# Patient Record
Sex: FEMALE | Race: BLACK OR AFRICAN AMERICAN | Employment: OTHER
[De-identification: names, ages, dates, MRNs, and addresses within clinical notes are randomized per-mention and may not be internally consistent; named-entity substitution may affect disease eponyms.]

---

## 2017-01-09 ENCOUNTER — TELEPHONE (OUTPATIENT)
Dept: OBGYN | Facility: CLINIC | Age: 72
End: 2017-01-09

## 2017-01-10 ENCOUNTER — OFFICE VISIT (OUTPATIENT)
Dept: FAMILY MEDICINE CLINIC | Facility: CLINIC | Age: 72
End: 2017-01-10

## 2017-01-10 VITALS
HEART RATE: 58 BPM | OXYGEN SATURATION: 98 % | SYSTOLIC BLOOD PRESSURE: 140 MMHG | DIASTOLIC BLOOD PRESSURE: 80 MMHG | BODY MASS INDEX: 33.06 KG/M2 | WEIGHT: 210.6 LBS | TEMPERATURE: 98.3 F | HEIGHT: 67 IN

## 2017-01-10 DIAGNOSIS — K59.01 SLOW TRANSIT CONSTIPATION: ICD-10-CM

## 2017-01-10 DIAGNOSIS — K64.8 INTERNAL AND EXTERNAL BLEEDING HEMORRHOIDS: ICD-10-CM

## 2017-01-10 DIAGNOSIS — K64.4 INTERNAL AND EXTERNAL BLEEDING HEMORRHOIDS: ICD-10-CM

## 2017-01-10 DIAGNOSIS — K62.89 RECTAL PAIN: Primary | ICD-10-CM

## 2017-01-10 PROCEDURE — 99213 OFFICE O/P EST LOW 20 MIN: CPT | Performed by: NURSE PRACTITIONER

## 2017-01-10 RX ORDER — AMOXICILLIN 250 MG
2 CAPSULE ORAL DAILY
Qty: 60 TABLET | Refills: 0 | Status: SHIPPED | OUTPATIENT
Start: 2017-01-10 | End: 2017-04-19 | Stop reason: ALTCHOICE

## 2017-01-10 ASSESSMENT — ENCOUNTER SYMPTOMS
NAUSEA: 0
ABDOMINAL PAIN: 0
ANAL BLEEDING: 1
COUGH: 0
CONSTIPATION: 1
DIARRHEA: 0
SHORTNESS OF BREATH: 0
RECTAL PAIN: 1
ABDOMINAL DISTENTION: 0
BLOOD IN STOOL: 0
VOMITING: 0

## 2017-01-12 ENCOUNTER — TELEPHONE (OUTPATIENT)
Dept: FAMILY MEDICINE CLINIC | Facility: CLINIC | Age: 72
End: 2017-01-12

## 2017-01-24 RX ORDER — POLYETHYLENE GLYCOL 3350 17 G/17G
17 POWDER, FOR SOLUTION ORAL DAILY
Qty: 527 G | Refills: 3 | Status: SHIPPED | OUTPATIENT
Start: 2017-01-24 | End: 2017-01-31 | Stop reason: SDUPTHER

## 2017-01-31 RX ORDER — POLYETHYLENE GLYCOL 3350 17 G/17G
17 POWDER, FOR SOLUTION ORAL DAILY
Qty: 527 G | Refills: 3 | Status: SHIPPED | OUTPATIENT
Start: 2017-01-31 | End: 2017-03-06 | Stop reason: SDUPTHER

## 2017-03-01 RX ORDER — POLYETHYLENE GLYCOL 3350 17 G/17G
17 POWDER, FOR SOLUTION ORAL DAILY
Qty: 527 G | Refills: 3 | OUTPATIENT
Start: 2017-03-01 | End: 2017-03-31

## 2017-03-06 RX ORDER — POLYETHYLENE GLYCOL 3350 17 G/17G
17 POWDER, FOR SOLUTION ORAL DAILY
Qty: 527 G | Refills: 3 | Status: SHIPPED | OUTPATIENT
Start: 2017-03-06 | End: 2017-04-05

## 2017-03-10 ENCOUNTER — CARE COORDINATION (OUTPATIENT)
Dept: CARE COORDINATION | Facility: CLINIC | Age: 72
End: 2017-03-10

## 2017-04-19 ENCOUNTER — OFFICE VISIT (OUTPATIENT)
Dept: FAMILY MEDICINE CLINIC | Age: 72
End: 2017-04-19
Payer: MEDICARE

## 2017-04-19 VITALS
DIASTOLIC BLOOD PRESSURE: 71 MMHG | TEMPERATURE: 99 F | RESPIRATION RATE: 18 BRPM | HEIGHT: 67 IN | HEART RATE: 64 BPM | SYSTOLIC BLOOD PRESSURE: 104 MMHG | BODY MASS INDEX: 32.18 KG/M2 | WEIGHT: 205 LBS

## 2017-04-19 DIAGNOSIS — R05.9 COUGH: Primary | ICD-10-CM

## 2017-04-19 DIAGNOSIS — B34.9 VIRAL SYNDROME: ICD-10-CM

## 2017-04-19 PROCEDURE — 99213 OFFICE O/P EST LOW 20 MIN: CPT | Performed by: NURSE PRACTITIONER

## 2017-04-19 RX ORDER — BENZONATATE 100 MG/1
100 CAPSULE ORAL 3 TIMES DAILY PRN
Qty: 60 CAPSULE | Refills: 0 | Status: SHIPPED | OUTPATIENT
Start: 2017-04-19 | End: 2017-04-26

## 2017-04-19 ASSESSMENT — ENCOUNTER SYMPTOMS
RHINORRHEA: 1
ABDOMINAL PAIN: 0
SWOLLEN GLANDS: 0
WHEEZING: 0
COUGH: 1
SHORTNESS OF BREATH: 0
TROUBLE SWALLOWING: 0
SORE THROAT: 0
SINUS PRESSURE: 1
CHEST TIGHTNESS: 1

## 2017-05-04 ENCOUNTER — TELEPHONE (OUTPATIENT)
Dept: FAMILY MEDICINE CLINIC | Age: 72
End: 2017-05-04

## 2017-05-04 DIAGNOSIS — Z79.899 CURRENT USE OF PROTON PUMP INHIBITOR: Primary | ICD-10-CM

## 2017-05-04 DIAGNOSIS — R73.01 IFG (IMPAIRED FASTING GLUCOSE): ICD-10-CM

## 2017-05-10 DIAGNOSIS — E78.5 HYPERLIPIDEMIA, UNSPECIFIED HYPERLIPIDEMIA TYPE: Primary | ICD-10-CM

## 2017-05-10 DIAGNOSIS — I10 ESSENTIAL HYPERTENSION: Chronic | ICD-10-CM

## 2017-05-11 ENCOUNTER — HOSPITAL ENCOUNTER (OUTPATIENT)
Age: 72
Setting detail: SPECIMEN
Discharge: HOME | End: 2017-05-11
Payer: MEDICARE

## 2017-05-11 DIAGNOSIS — Z79.899 CURRENT USE OF PROTON PUMP INHIBITOR: ICD-10-CM

## 2017-05-11 DIAGNOSIS — R73.01 IFG (IMPAIRED FASTING GLUCOSE): ICD-10-CM

## 2017-05-11 LAB
CHOLESTEROL/HDL RATIO: 3.6
CHOLESTEROL: 221 MG/DL
ESTIMATED AVERAGE GLUCOSE: 134 MG/DL
HBA1C MFR BLD: 6.3 % (ref 4–6)
HDLC SERPL-MCNC: 61 MG/DL
LDL CHOLESTEROL: 139 MG/DL (ref 0–130)
MAGNESIUM: 2 MG/DL (ref 1.6–2.6)
TRIGL SERPL-MCNC: 105 MG/DL
VLDLC SERPL CALC-MCNC: ABNORMAL MG/DL (ref 1–30)

## 2017-05-16 ENCOUNTER — OFFICE VISIT (OUTPATIENT)
Dept: FAMILY MEDICINE CLINIC | Age: 72
End: 2017-05-16
Payer: MEDICARE

## 2017-05-16 VITALS
WEIGHT: 203 LBS | DIASTOLIC BLOOD PRESSURE: 70 MMHG | TEMPERATURE: 96.8 F | HEIGHT: 67 IN | BODY MASS INDEX: 31.86 KG/M2 | OXYGEN SATURATION: 98 % | HEART RATE: 51 BPM | SYSTOLIC BLOOD PRESSURE: 115 MMHG

## 2017-05-16 DIAGNOSIS — K62.89 RECTAL PAIN: ICD-10-CM

## 2017-05-16 DIAGNOSIS — K59.01 SLOW TRANSIT CONSTIPATION: Primary | ICD-10-CM

## 2017-05-16 PROCEDURE — 3017F COLORECTAL CA SCREEN DOC REV: CPT | Performed by: NURSE PRACTITIONER

## 2017-05-16 PROCEDURE — G8427 DOCREV CUR MEDS BY ELIG CLIN: HCPCS | Performed by: NURSE PRACTITIONER

## 2017-05-16 PROCEDURE — G8399 PT W/DXA RESULTS DOCUMENT: HCPCS | Performed by: NURSE PRACTITIONER

## 2017-05-16 PROCEDURE — 1090F PRES/ABSN URINE INCON ASSESS: CPT | Performed by: NURSE PRACTITIONER

## 2017-05-16 PROCEDURE — 3014F SCREEN MAMMO DOC REV: CPT | Performed by: NURSE PRACTITIONER

## 2017-05-16 PROCEDURE — 1036F TOBACCO NON-USER: CPT | Performed by: NURSE PRACTITIONER

## 2017-05-16 PROCEDURE — 4040F PNEUMOC VAC/ADMIN/RCVD: CPT | Performed by: NURSE PRACTITIONER

## 2017-05-16 PROCEDURE — 1123F ACP DISCUSS/DSCN MKR DOCD: CPT | Performed by: NURSE PRACTITIONER

## 2017-05-16 PROCEDURE — 99213 OFFICE O/P EST LOW 20 MIN: CPT | Performed by: NURSE PRACTITIONER

## 2017-05-16 PROCEDURE — G8419 CALC BMI OUT NRM PARAM NOF/U: HCPCS | Performed by: NURSE PRACTITIONER

## 2017-05-16 RX ORDER — POLYETHYLENE GLYCOL 3350 17 G/17G
POWDER, FOR SOLUTION ORAL
COMMUNITY
Start: 2017-04-26 | End: 2019-02-12 | Stop reason: SDUPTHER

## 2017-05-16 RX ORDER — GABAPENTIN 100 MG/1
100 CAPSULE ORAL DAILY
Qty: 90 CAPSULE | Refills: 0 | Status: SHIPPED | OUTPATIENT
Start: 2017-05-16 | End: 2017-08-22 | Stop reason: SINTOL

## 2017-05-16 RX ORDER — OMEPRAZOLE 20 MG/1
CAPSULE, DELAYED RELEASE ORAL
Qty: 90 CAPSULE | Refills: 1 | Status: SHIPPED | OUTPATIENT
Start: 2017-05-16 | End: 2018-01-11 | Stop reason: SDUPTHER

## 2017-05-16 ASSESSMENT — ENCOUNTER SYMPTOMS
RECTAL PAIN: 1
COUGH: 0
CHEST TIGHTNESS: 0
NAUSEA: 0
CONSTIPATION: 0
VOMITING: 0
ANAL BLEEDING: 0
DIARRHEA: 0
BLOOD IN STOOL: 0
ABDOMINAL PAIN: 0
SHORTNESS OF BREATH: 0
ABDOMINAL DISTENTION: 0

## 2017-05-19 ENCOUNTER — TELEPHONE (OUTPATIENT)
Dept: FAMILY MEDICINE CLINIC | Age: 72
End: 2017-05-19

## 2017-05-25 ENCOUNTER — TELEPHONE (OUTPATIENT)
Dept: FAMILY MEDICINE CLINIC | Age: 72
End: 2017-05-25

## 2017-05-25 DIAGNOSIS — K62.89 RECTAL PAIN: Primary | ICD-10-CM

## 2017-05-25 RX ORDER — HYDROCORTISONE ACETATE 25 MG/1
25 SUPPOSITORY RECTAL EVERY 12 HOURS
Qty: 28 SUPPOSITORY | Refills: 0 | Status: SHIPPED | OUTPATIENT
Start: 2017-05-25 | End: 2017-06-08

## 2017-06-26 RX ORDER — POLYETHYLENE GLYCOL 3350 17 G/17G
POWDER, FOR SOLUTION ORAL
Qty: 100 EACH | Refills: 2 | Status: SHIPPED | OUTPATIENT
Start: 2017-06-26 | End: 2020-03-16

## 2017-08-09 ENCOUNTER — TELEPHONE (OUTPATIENT)
Dept: FAMILY MEDICINE CLINIC | Age: 72
End: 2017-08-09

## 2017-08-09 DIAGNOSIS — Z12.31 ENCOUNTER FOR SCREENING MAMMOGRAM FOR MALIGNANT NEOPLASM OF BREAST: Primary | ICD-10-CM

## 2017-08-22 ENCOUNTER — OFFICE VISIT (OUTPATIENT)
Dept: FAMILY MEDICINE CLINIC | Age: 72
End: 2017-08-22
Payer: MEDICARE

## 2017-08-22 VITALS
TEMPERATURE: 96.2 F | OXYGEN SATURATION: 97 % | BODY MASS INDEX: 32.24 KG/M2 | SYSTOLIC BLOOD PRESSURE: 134 MMHG | HEIGHT: 67 IN | HEART RATE: 53 BPM | WEIGHT: 205.4 LBS | DIASTOLIC BLOOD PRESSURE: 74 MMHG

## 2017-08-22 DIAGNOSIS — K64.9 HEMORRHOIDS, UNSPECIFIED HEMORRHOID TYPE: Primary | ICD-10-CM

## 2017-08-22 DIAGNOSIS — K62.89 RECTAL PAIN: ICD-10-CM

## 2017-08-22 DIAGNOSIS — K59.01 SLOW TRANSIT CONSTIPATION: ICD-10-CM

## 2017-08-22 PROCEDURE — 4040F PNEUMOC VAC/ADMIN/RCVD: CPT | Performed by: NURSE PRACTITIONER

## 2017-08-22 PROCEDURE — 1123F ACP DISCUSS/DSCN MKR DOCD: CPT | Performed by: NURSE PRACTITIONER

## 2017-08-22 PROCEDURE — G8419 CALC BMI OUT NRM PARAM NOF/U: HCPCS | Performed by: NURSE PRACTITIONER

## 2017-08-22 PROCEDURE — G8427 DOCREV CUR MEDS BY ELIG CLIN: HCPCS | Performed by: NURSE PRACTITIONER

## 2017-08-22 PROCEDURE — 1090F PRES/ABSN URINE INCON ASSESS: CPT | Performed by: NURSE PRACTITIONER

## 2017-08-22 PROCEDURE — 3017F COLORECTAL CA SCREEN DOC REV: CPT | Performed by: NURSE PRACTITIONER

## 2017-08-22 PROCEDURE — 1036F TOBACCO NON-USER: CPT | Performed by: NURSE PRACTITIONER

## 2017-08-22 PROCEDURE — 3014F SCREEN MAMMO DOC REV: CPT | Performed by: NURSE PRACTITIONER

## 2017-08-22 PROCEDURE — G8399 PT W/DXA RESULTS DOCUMENT: HCPCS | Performed by: NURSE PRACTITIONER

## 2017-08-22 PROCEDURE — 99213 OFFICE O/P EST LOW 20 MIN: CPT | Performed by: NURSE PRACTITIONER

## 2017-08-22 ASSESSMENT — ENCOUNTER SYMPTOMS
SHORTNESS OF BREATH: 0
ABDOMINAL PAIN: 0
COUGH: 0
NAUSEA: 0
RECTAL PAIN: 1
ANAL BLEEDING: 0
VOMITING: 0
DIARRHEA: 0
BACK PAIN: 0
CHEST TIGHTNESS: 0
CONSTIPATION: 0
BLOOD IN STOOL: 0
ABDOMINAL DISTENTION: 0

## 2017-10-05 DIAGNOSIS — R92.8 ABNORMAL MAMMOGRAM OF LEFT BREAST: Primary | ICD-10-CM

## 2017-10-10 DIAGNOSIS — R92.8 ABNORMAL MAMMOGRAM OF LEFT BREAST: ICD-10-CM

## 2017-10-10 DIAGNOSIS — Z12.31 ENCOUNTER FOR SCREENING MAMMOGRAM FOR MALIGNANT NEOPLASM OF BREAST: ICD-10-CM

## 2017-10-12 LAB
CREATININE: 0.8 MG/DL
CREATININE: 0.8 MG/DL
POTASSIUM (K+): 4.2

## 2018-01-09 ENCOUNTER — TELEPHONE (OUTPATIENT)
Dept: FAMILY MEDICINE CLINIC | Age: 73
End: 2018-01-09

## 2018-01-11 RX ORDER — OMEPRAZOLE 20 MG/1
CAPSULE, DELAYED RELEASE ORAL
Qty: 90 CAPSULE | Refills: 1 | Status: SHIPPED | OUTPATIENT
Start: 2018-01-11 | End: 2018-07-10 | Stop reason: SDUPTHER

## 2018-01-15 ENCOUNTER — OFFICE VISIT (OUTPATIENT)
Dept: FAMILY MEDICINE CLINIC | Age: 73
End: 2018-01-15
Payer: MEDICARE

## 2018-01-15 VITALS
SYSTOLIC BLOOD PRESSURE: 118 MMHG | WEIGHT: 212.2 LBS | HEIGHT: 67 IN | DIASTOLIC BLOOD PRESSURE: 74 MMHG | OXYGEN SATURATION: 98 % | HEART RATE: 116 BPM | BODY MASS INDEX: 33.3 KG/M2 | TEMPERATURE: 98.2 F

## 2018-01-15 DIAGNOSIS — M79.602 LEFT ARM PAIN: Primary | ICD-10-CM

## 2018-01-15 PROCEDURE — G8417 CALC BMI ABV UP PARAM F/U: HCPCS | Performed by: NURSE PRACTITIONER

## 2018-01-15 PROCEDURE — 3014F SCREEN MAMMO DOC REV: CPT | Performed by: NURSE PRACTITIONER

## 2018-01-15 PROCEDURE — 4040F PNEUMOC VAC/ADMIN/RCVD: CPT | Performed by: NURSE PRACTITIONER

## 2018-01-15 PROCEDURE — 3017F COLORECTAL CA SCREEN DOC REV: CPT | Performed by: NURSE PRACTITIONER

## 2018-01-15 PROCEDURE — 1036F TOBACCO NON-USER: CPT | Performed by: NURSE PRACTITIONER

## 2018-01-15 PROCEDURE — 1090F PRES/ABSN URINE INCON ASSESS: CPT | Performed by: NURSE PRACTITIONER

## 2018-01-15 PROCEDURE — 99213 OFFICE O/P EST LOW 20 MIN: CPT | Performed by: NURSE PRACTITIONER

## 2018-01-15 PROCEDURE — G8484 FLU IMMUNIZE NO ADMIN: HCPCS | Performed by: NURSE PRACTITIONER

## 2018-01-15 PROCEDURE — G8427 DOCREV CUR MEDS BY ELIG CLIN: HCPCS | Performed by: NURSE PRACTITIONER

## 2018-01-15 PROCEDURE — G8399 PT W/DXA RESULTS DOCUMENT: HCPCS | Performed by: NURSE PRACTITIONER

## 2018-01-15 PROCEDURE — 1123F ACP DISCUSS/DSCN MKR DOCD: CPT | Performed by: NURSE PRACTITIONER

## 2018-01-15 ASSESSMENT — ENCOUNTER SYMPTOMS
SHORTNESS OF BREATH: 0
COUGH: 0
VOMITING: 0
CHEST TIGHTNESS: 0
NAUSEA: 0
ABDOMINAL PAIN: 0

## 2018-01-15 NOTE — PROGRESS NOTES
[address]  [place], [address]  [phone]      Da Gaviria is a 67 y.o. female who presents today for her  medical conditions/complaints as noted below. Da Gaviria is c/o of Arm Pain (thought patch was causing problem with left arm aching,cardiology does not think so,feels pain everyday,was advised to take magnesium,used for 6 days but causes constipation. trying pure mineral skin spray that helps for about 20-30 mins, Bowels have been better)  . HPI:   Arm Pain    The incident occurred more than 1 week ago (last 3 months worsening to constant). There was no injury mechanism. The pain is present in the left shoulder, left hand and left elbow. The quality of the pain is described as aching. The pain radiates to the left arm and left hand. The pain is moderate. The pain has been constant since the incident. Pertinent negatives include no chest pain, muscle weakness, numbness or tingling. The symptoms are aggravated by palpation and movement. She has tried acetaminophen, elevation, heat, rest and NSAIDs (all natural mineral spray) for the symptoms. The treatment provided mild relief.        Past Medical History:   Diagnosis Date    Atrial flutter ([address]     with palpitations    Chronic constipation     whole life    Frequent sinus infections     GERD (gastroesophageal reflux disease)     resolved    Hemorrhoid 10/26/2016    internal and external    Hemorrhoids 8/22/2017    Hypertension     Pain     Palpitations     Tooth wear     FALSE TOOTH LOWER BOTTOM REMOVABLE PERMANENT DENTAL WORK    Wears glasses       Past Surgical History:   Procedure Laterality Date    ANUS SURGERY      fistulotomy    APPENDECTOMY  1960    BREAST BIOPSY Left 1988    COLONOSCOPY  10/26/2016    adenomatous polyps(tubular adenomas)    HYSTERECTOMY      FIBROIDS; one ovarian preservation    KNEE ARTHROSCOPY      LEFT    LUMBAR DISC SURGERY      LUMBAR FUSION

## 2018-03-28 ENCOUNTER — OFFICE VISIT (OUTPATIENT)
Dept: FAMILY MEDICINE CLINIC | Age: 73
End: 2018-03-28
Payer: MEDICARE

## 2018-03-28 VITALS
HEIGHT: 67 IN | DIASTOLIC BLOOD PRESSURE: 74 MMHG | WEIGHT: 213 LBS | TEMPERATURE: 98 F | BODY MASS INDEX: 33.43 KG/M2 | HEART RATE: 58 BPM | OXYGEN SATURATION: 98 % | SYSTOLIC BLOOD PRESSURE: 132 MMHG

## 2018-03-28 DIAGNOSIS — M15.9 OSTEOARTHRITIS OF MULTIPLE JOINTS, UNSPECIFIED OSTEOARTHRITIS TYPE: Primary | ICD-10-CM

## 2018-03-28 PROCEDURE — G8427 DOCREV CUR MEDS BY ELIG CLIN: HCPCS | Performed by: NURSE PRACTITIONER

## 2018-03-28 PROCEDURE — 1036F TOBACCO NON-USER: CPT | Performed by: NURSE PRACTITIONER

## 2018-03-28 PROCEDURE — 1123F ACP DISCUSS/DSCN MKR DOCD: CPT | Performed by: NURSE PRACTITIONER

## 2018-03-28 PROCEDURE — G8417 CALC BMI ABV UP PARAM F/U: HCPCS | Performed by: NURSE PRACTITIONER

## 2018-03-28 PROCEDURE — 3014F SCREEN MAMMO DOC REV: CPT | Performed by: NURSE PRACTITIONER

## 2018-03-28 PROCEDURE — 3017F COLORECTAL CA SCREEN DOC REV: CPT | Performed by: NURSE PRACTITIONER

## 2018-03-28 PROCEDURE — 4040F PNEUMOC VAC/ADMIN/RCVD: CPT | Performed by: NURSE PRACTITIONER

## 2018-03-28 PROCEDURE — 99213 OFFICE O/P EST LOW 20 MIN: CPT | Performed by: NURSE PRACTITIONER

## 2018-03-28 PROCEDURE — G8399 PT W/DXA RESULTS DOCUMENT: HCPCS | Performed by: NURSE PRACTITIONER

## 2018-03-28 PROCEDURE — 1090F PRES/ABSN URINE INCON ASSESS: CPT | Performed by: NURSE PRACTITIONER

## 2018-03-28 PROCEDURE — G8484 FLU IMMUNIZE NO ADMIN: HCPCS | Performed by: NURSE PRACTITIONER

## 2018-03-28 RX ORDER — MELOXICAM 7.5 MG/1
7.5 TABLET ORAL DAILY
Qty: 30 TABLET | Refills: 3 | Status: SHIPPED | OUTPATIENT
Start: 2018-03-28 | End: 2018-04-12 | Stop reason: ALTCHOICE

## 2018-03-28 ASSESSMENT — ENCOUNTER SYMPTOMS
COLOR CHANGE: 0
SHORTNESS OF BREATH: 0
BACK PAIN: 1

## 2018-03-28 NOTE — PROGRESS NOTES
Visit Information    Have you changed or started any medications since your last visit including any over-the-counter medicines, vitamins, or herbal medicines? no   Have you stopped taking any of your medications? Is so, why? -  no  Are you having any side effects from any of your medications? - no    Have you seen any other physician or provider since your last visit?  no   Have you had any other diagnostic tests since your last visit?  no   Have you been seen in the emergency room and/or had an admission in a hospital since we last saw you?  no   Have you had your routine dental cleaning in the past 6 months?  no     Do you have an active MyChart account? If no, what is the barrier?   No: na    Patient Care Team:  Anjelica Wallace CNP as PCP - General (Nurse Practitioner)    Medical History Review  Past Medical, Family, and Social History reviewed and  contribute to the patient presenting condition    Health Maintenance   Topic Date Due    DTaP/Tdap/Td vaccine (1 - Tdap) 03/27/1963    Shingles Vaccine (1 of 2 - 2 Dose Series) 03/27/1994    Pneumococcal low/med risk (1 of 2 - PCV13) 03/27/2009    A1C test (Diabetic or Prediabetic)  05/11/2018    Potassium monitoring  10/12/2018    Creatinine monitoring  10/12/2018    Breast cancer screen  10/10/2019    Lipid screen  05/11/2022    Colon cancer screen colonoscopy  10/26/2026    DEXA (modify frequency per FRAX score)  Addressed

## 2018-04-05 ENCOUNTER — TELEPHONE (OUTPATIENT)
Dept: FAMILY MEDICINE CLINIC | Age: 73
End: 2018-04-05

## 2018-04-12 ENCOUNTER — TELEPHONE (OUTPATIENT)
Dept: FAMILY MEDICINE CLINIC | Age: 73
End: 2018-04-12

## 2018-04-12 RX ORDER — IBUPROFEN 800 MG/1
800 TABLET ORAL 2 TIMES DAILY PRN
Qty: 60 TABLET | Refills: 1 | Status: SHIPPED | OUTPATIENT
Start: 2018-04-12 | End: 2018-11-08 | Stop reason: SDUPTHER

## 2018-05-07 ENCOUNTER — TELEPHONE (OUTPATIENT)
Dept: FAMILY MEDICINE CLINIC | Age: 73
End: 2018-05-07

## 2018-05-30 RX ORDER — NEBIVOLOL 10 MG/1
TABLET ORAL
Qty: 270 TABLET | Refills: 1 | Status: SHIPPED | OUTPATIENT
Start: 2018-05-30 | End: 2018-11-08 | Stop reason: SDUPTHER

## 2018-07-10 RX ORDER — OMEPRAZOLE 20 MG/1
CAPSULE, DELAYED RELEASE ORAL
Qty: 90 CAPSULE | Refills: 1 | Status: SHIPPED | OUTPATIENT
Start: 2018-07-10 | End: 2019-01-06 | Stop reason: SDUPTHER

## 2018-07-25 ENCOUNTER — OFFICE VISIT (OUTPATIENT)
Dept: FAMILY MEDICINE CLINIC | Age: 73
End: 2018-07-25
Payer: MEDICARE

## 2018-07-25 VITALS
HEIGHT: 67 IN | BODY MASS INDEX: 32.8 KG/M2 | DIASTOLIC BLOOD PRESSURE: 78 MMHG | OXYGEN SATURATION: 98 % | WEIGHT: 209 LBS | TEMPERATURE: 96.4 F | HEART RATE: 50 BPM | SYSTOLIC BLOOD PRESSURE: 138 MMHG

## 2018-07-25 DIAGNOSIS — R73.03 PREDIABETES: ICD-10-CM

## 2018-07-25 DIAGNOSIS — B37.2 YEAST DERMATITIS: Primary | ICD-10-CM

## 2018-07-25 LAB — HBA1C MFR BLD: 6.2 %

## 2018-07-25 PROCEDURE — 3014F SCREEN MAMMO DOC REV: CPT | Performed by: NURSE PRACTITIONER

## 2018-07-25 PROCEDURE — 83036 HEMOGLOBIN GLYCOSYLATED A1C: CPT | Performed by: NURSE PRACTITIONER

## 2018-07-25 PROCEDURE — 4040F PNEUMOC VAC/ADMIN/RCVD: CPT | Performed by: NURSE PRACTITIONER

## 2018-07-25 PROCEDURE — 1123F ACP DISCUSS/DSCN MKR DOCD: CPT | Performed by: NURSE PRACTITIONER

## 2018-07-25 PROCEDURE — G8399 PT W/DXA RESULTS DOCUMENT: HCPCS | Performed by: NURSE PRACTITIONER

## 2018-07-25 PROCEDURE — 99213 OFFICE O/P EST LOW 20 MIN: CPT | Performed by: NURSE PRACTITIONER

## 2018-07-25 PROCEDURE — 1036F TOBACCO NON-USER: CPT | Performed by: NURSE PRACTITIONER

## 2018-07-25 PROCEDURE — G8427 DOCREV CUR MEDS BY ELIG CLIN: HCPCS | Performed by: NURSE PRACTITIONER

## 2018-07-25 PROCEDURE — 1090F PRES/ABSN URINE INCON ASSESS: CPT | Performed by: NURSE PRACTITIONER

## 2018-07-25 PROCEDURE — 3017F COLORECTAL CA SCREEN DOC REV: CPT | Performed by: NURSE PRACTITIONER

## 2018-07-25 PROCEDURE — 1101F PT FALLS ASSESS-DOCD LE1/YR: CPT | Performed by: NURSE PRACTITIONER

## 2018-07-25 PROCEDURE — G8417 CALC BMI ABV UP PARAM F/U: HCPCS | Performed by: NURSE PRACTITIONER

## 2018-07-25 ASSESSMENT — PATIENT HEALTH QUESTIONNAIRE - PHQ9
SUM OF ALL RESPONSES TO PHQ QUESTIONS 1-9: 0
SUM OF ALL RESPONSES TO PHQ9 QUESTIONS 1 & 2: 0
2. FEELING DOWN, DEPRESSED OR HOPELESS: 0
1. LITTLE INTEREST OR PLEASURE IN DOING THINGS: 0

## 2018-07-25 ASSESSMENT — ENCOUNTER SYMPTOMS: SHORTNESS OF BREATH: 0

## 2018-07-25 NOTE — PROGRESS NOTES
[address] [facility], [address]  [phone]      Paty Nesbitt is a 75 y.o. female who presents today for her  medical conditions/complaints as noted below. Paty Nesbitt is c/o of Rash (under breasts,itching)  . HPI:   Pt here for evaluation of new onset rash under bilateral breasts. This started a few days ago and has been itchy. She has tried a dry towel under her breasts with no relief. She has not changed any of her bras and or clothing.          Past Medical History:   Diagnosis Date    Atrial flutter (HCC)     with palpitations    Chronic constipation     whole life    Frequent sinus infections     GERD (gastroesophageal reflux disease)     resolved    Hemorrhoid 10/26/2016    internal and external    Hemorrhoids 8/22/2017    Hypertension     Pain     Palpitations     Tooth wear     FALSE TOOTH LOWER BOTTOM REMOVABLE PERMANENT DENTAL WORK    Wears glasses       Past Surgical History:   Procedure Laterality Date    ANUS SURGERY      fistulotomy    APPENDECTOMY  1960    BREAST BIOPSY Left 1988    COLONOSCOPY  10/26/2016    adenomatous polyps(tubular adenomas)    HYSTERECTOMY      FIBROIDS; one ovarian preservation    KNEE ARTHROSCOPY      LEFT    LUMBAR DISC SURGERY      LUMBAR FUSION  12/30/2013    Transforaminal Lumbar Interbody Fusion L4-5, S1    THYROIDECTOMY, PARTIAL      TONSILLECTOMY       Family History   Problem Relation Age of Onset    Cancer Sister         pancrease    Heart Disease Mother     Heart Disease Father     Diabetes Brother     No Known Problems Maternal Grandmother     No Known Problems Maternal Grandfather     No Known Problems Paternal Grandmother     No Known Problems Paternal Grandfather      Social History   Substance Use Topics    Smoking status: Former Smoker     Packs/day: 0.25     Years: 5.00     Types: Cigarettes     Quit date: 7/25/1977    Smokeless tobacco: Never Used      Comment: QUIT SMOKING YRS AGO 1970's    Alcohol use 0.6 oz/week     1 Glasses of wine per week      Comment: OCC WINE      Current Outpatient Prescriptions   Medication Sig Dispense Refill    nystatin-triamcinolone (MYCOLOG II) 260409-4.7 UNIT/GM-% cream Apply topically 2 times daily. 30 g 1    omeprazole (PRILOSEC) 20 MG delayed release capsule TAKE 1 CAPSULE DAILY 90 capsule 1    nebivolol (BYSTOLIC) 10 MG tablet TAKE 1 TABLET IN THE MORNING AND 2 TABLETS AT NIGHT 270 tablet 1    ibuprofen (ADVIL;MOTRIN) 800 MG tablet Take 1 tablet by mouth 2 times daily as needed for Pain 60 tablet 1    cloNIDine (CATAPRES) 0.2 MG/24HR APPLY 1 PATCH WEEKLY 12 patch 2    hydrocortisone-pramoxine (ANALPRAM-HC) 2.5-1 % rectal cream Place rectally 3 times daily 30 g 0    polyethylene glycol (GLYCOLAX) packet DISSOLVE ONE PACKET IN 8OZ LIQUID & DRINK BY MOUTH DAILY 100 each 2    losartan-hydrochlorothiazide (HYZAAR) 100-25 MG per tablet Take 1 tablet by mouth nightly       aspirin 81 MG tablet Take 1 tablet by mouth daily. 30 tablet 0    polyethylene glycol (GLYCOLAX) packet        No current facility-administered medications for this visit.       Allergies   Allergen Reactions    Codeine      Feels like will pass out    Other Other (See Comments)     Red dyes dizziness     Cortisone     Flu Virus Vaccine Nausea And Vomiting    Synthroid [Levothyroxine Sodium] Nausea And Vomiting       Health Maintenance   Topic Date Due    A1C test (Diabetic or Prediabetic)  05/11/2018    Pneumococcal low/med risk (1 of 2 - PCV13) 03/07/2019 (Originally 3/27/2009)    DTaP/Tdap/Td vaccine (1 - Tdap) 07/14/2020 (Originally 3/27/1963)    Shingles Vaccine (1 of 2 - 2 Dose Series) 07/06/2021 (Originally 3/27/1994)    Potassium monitoring  10/12/2018    Creatinine monitoring  10/12/2018    Breast cancer screen  10/10/2019    Lipid screen  05/11/2022    Colon cancer screen colonoscopy  10/26/2026    DEXA (modify frequency per FRAX score)

## 2018-09-18 ENCOUNTER — OFFICE VISIT (OUTPATIENT)
Dept: FAMILY MEDICINE CLINIC | Age: 73
End: 2018-09-18
Payer: MEDICARE

## 2018-09-18 VITALS
HEIGHT: 67 IN | OXYGEN SATURATION: 97 % | DIASTOLIC BLOOD PRESSURE: 76 MMHG | TEMPERATURE: 96.8 F | SYSTOLIC BLOOD PRESSURE: 130 MMHG | BODY MASS INDEX: 32.96 KG/M2 | HEART RATE: 56 BPM | WEIGHT: 210 LBS

## 2018-09-18 DIAGNOSIS — B37.2 YEAST DERMATITIS: Primary | ICD-10-CM

## 2018-09-18 PROCEDURE — 3017F COLORECTAL CA SCREEN DOC REV: CPT | Performed by: NURSE PRACTITIONER

## 2018-09-18 PROCEDURE — 3014F SCREEN MAMMO DOC REV: CPT | Performed by: NURSE PRACTITIONER

## 2018-09-18 PROCEDURE — 1036F TOBACCO NON-USER: CPT | Performed by: NURSE PRACTITIONER

## 2018-09-18 PROCEDURE — 1090F PRES/ABSN URINE INCON ASSESS: CPT | Performed by: NURSE PRACTITIONER

## 2018-09-18 PROCEDURE — 99213 OFFICE O/P EST LOW 20 MIN: CPT | Performed by: NURSE PRACTITIONER

## 2018-09-18 PROCEDURE — 4040F PNEUMOC VAC/ADMIN/RCVD: CPT | Performed by: NURSE PRACTITIONER

## 2018-09-18 PROCEDURE — G8399 PT W/DXA RESULTS DOCUMENT: HCPCS | Performed by: NURSE PRACTITIONER

## 2018-09-18 PROCEDURE — G8427 DOCREV CUR MEDS BY ELIG CLIN: HCPCS | Performed by: NURSE PRACTITIONER

## 2018-09-18 PROCEDURE — 1123F ACP DISCUSS/DSCN MKR DOCD: CPT | Performed by: NURSE PRACTITIONER

## 2018-09-18 PROCEDURE — G8417 CALC BMI ABV UP PARAM F/U: HCPCS | Performed by: NURSE PRACTITIONER

## 2018-09-18 PROCEDURE — 1101F PT FALLS ASSESS-DOCD LE1/YR: CPT | Performed by: NURSE PRACTITIONER

## 2018-09-18 RX ORDER — NYSTATIN 100000 [USP'U]/G
POWDER TOPICAL
Qty: 60 G | Refills: 0 | Status: SHIPPED | OUTPATIENT
Start: 2018-09-18 | End: 2018-09-24 | Stop reason: SINTOL

## 2018-09-18 ASSESSMENT — ENCOUNTER SYMPTOMS
SHORTNESS OF BREATH: 0
ABDOMINAL PAIN: 0
BACK PAIN: 0

## 2018-09-18 NOTE — PROGRESS NOTES
answered. Pt voiced understanding. Reviewed health maintenance. Instructed to continue current medications, diet and exercise.     Electronically signed by Elisa Mckinnon CNP on 9/18/2018 at 10:24 AM

## 2018-09-24 ENCOUNTER — TELEPHONE (OUTPATIENT)
Dept: FAMILY MEDICINE CLINIC | Age: 73
End: 2018-09-24

## 2018-09-24 RX ORDER — FLUCONAZOLE 100 MG/1
100 TABLET ORAL DAILY
Qty: 5 TABLET | Refills: 0 | Status: SHIPPED | OUTPATIENT
Start: 2018-09-24 | End: 2018-09-28 | Stop reason: SDUPTHER

## 2018-09-28 RX ORDER — FLUCONAZOLE 100 MG/1
100 TABLET ORAL DAILY
Qty: 2 TABLET | Refills: 0 | Status: SHIPPED | OUTPATIENT
Start: 2018-09-28 | End: 2018-09-30

## 2018-09-28 NOTE — TELEPHONE ENCOUNTER
Patient called stating she is on her last pill of the diflucan. She states it helped a lot with the itching but not all the way gone, she is wondering if she needs another prescription? Please advise.  Thank you

## 2018-10-01 ENCOUNTER — TELEPHONE (OUTPATIENT)
Dept: FAMILY MEDICINE CLINIC | Age: 73
End: 2018-10-01

## 2018-10-02 ENCOUNTER — NURSE ONLY (OUTPATIENT)
Dept: FAMILY MEDICINE CLINIC | Age: 73
End: 2018-10-02
Payer: MEDICARE

## 2018-10-02 ENCOUNTER — TELEPHONE (OUTPATIENT)
Dept: FAMILY MEDICINE CLINIC | Age: 73
End: 2018-10-02

## 2018-10-02 DIAGNOSIS — Z23 NEED FOR PNEUMOCOCCAL VACCINATION: Primary | ICD-10-CM

## 2018-10-02 DIAGNOSIS — Z12.39 SCREENING FOR BREAST CANCER: Primary | ICD-10-CM

## 2018-10-02 DIAGNOSIS — M25.569 KNEE PAIN, UNSPECIFIED CHRONICITY, UNSPECIFIED LATERALITY: ICD-10-CM

## 2018-10-02 DIAGNOSIS — M25.559 ARTHRALGIA OF HIP, UNSPECIFIED LATERALITY: ICD-10-CM

## 2018-10-02 PROCEDURE — 90670 PCV13 VACCINE IM: CPT | Performed by: NURSE PRACTITIONER

## 2018-10-02 PROCEDURE — G0009 ADMIN PNEUMOCOCCAL VACCINE: HCPCS | Performed by: NURSE PRACTITIONER

## 2018-10-05 ENCOUNTER — OFFICE VISIT (OUTPATIENT)
Dept: ORTHOPEDIC SURGERY | Age: 73
End: 2018-10-05
Payer: MEDICARE

## 2018-10-05 VITALS — WEIGHT: 206 LBS | BODY MASS INDEX: 32.33 KG/M2 | HEIGHT: 67 IN

## 2018-10-05 DIAGNOSIS — M17.12 ARTHRITIS OF LEFT KNEE: ICD-10-CM

## 2018-10-05 DIAGNOSIS — M17.11 ARTHRITIS OF RIGHT KNEE: Primary | ICD-10-CM

## 2018-10-05 DIAGNOSIS — M16.11 ARTHRITIS OF RIGHT HIP: ICD-10-CM

## 2018-10-05 DIAGNOSIS — Q79.9 BONY ABNORMALITY: ICD-10-CM

## 2018-10-05 PROCEDURE — 1090F PRES/ABSN URINE INCON ASSESS: CPT | Performed by: ORTHOPAEDIC SURGERY

## 2018-10-05 PROCEDURE — 1101F PT FALLS ASSESS-DOCD LE1/YR: CPT | Performed by: ORTHOPAEDIC SURGERY

## 2018-10-05 PROCEDURE — 3017F COLORECTAL CA SCREEN DOC REV: CPT | Performed by: ORTHOPAEDIC SURGERY

## 2018-10-05 PROCEDURE — 3014F SCREEN MAMMO DOC REV: CPT | Performed by: ORTHOPAEDIC SURGERY

## 2018-10-05 PROCEDURE — G8417 CALC BMI ABV UP PARAM F/U: HCPCS | Performed by: ORTHOPAEDIC SURGERY

## 2018-10-05 PROCEDURE — 99204 OFFICE O/P NEW MOD 45 MIN: CPT | Performed by: ORTHOPAEDIC SURGERY

## 2018-10-05 PROCEDURE — G8484 FLU IMMUNIZE NO ADMIN: HCPCS | Performed by: ORTHOPAEDIC SURGERY

## 2018-10-05 PROCEDURE — G8427 DOCREV CUR MEDS BY ELIG CLIN: HCPCS | Performed by: ORTHOPAEDIC SURGERY

## 2018-10-05 ASSESSMENT — ENCOUNTER SYMPTOMS
NAUSEA: 0
COUGH: 0
DIARRHEA: 0
CONSTIPATION: 0

## 2018-10-05 NOTE — PROGRESS NOTES
[facility]  [address] [place], [place] [address] [address] [address]  Dept: [phone]    Ambulatory Orthopedic Consult      CHIEF COMPLAINT:    Chief Complaint   Patient presents with    Knee Pain     bilateral    Hip Pain     right       HISTORY OF PRESENT ILLNESS:      The patient is a 75 y.o. female who is being seen at the request of  JULIA Dow CNP for consultation and evaluation of right knee and right hip pain. Reba Baird presents for bilateral knee pain, right being worse than left that has been progressively getting worse The patient does not recall a specific injury. The pain improves with rest.  The pain worsens with  stair climbing and arising from a seated position. Patient states she is not starting to have a hard time ambulating. Instability is  noted. Patient states that she does use a cane or a shopping cart to help with ambulation. She states using a walking device helps with the pain and taking some of the weight off of the right knee. The patient has not had a previous corticosteroid injection. The patient has not had previous physical therapy for this problem. The patient has tried oral NSAIDs for this problem previously. Patient has also tried anti inflammatories and organic topical creams. Patient also complains of pain in the right hip. The pain does not radiate into the thigh and into the groin. The pain is   made worse with weightbearing. The pain is  worse at night. The pain is  worse when laying on the affected side.   The pain is made better with rest.         Past Medical History:    Past Medical History:   Diagnosis Date    Atrial flutter ([address]     with palpitations    Chronic constipation     whole life    Frequent sinus infections     GERD (gastroesophageal reflux disease)     resolved    Hemorrhoid 10/26/2016    internal and external    Hemorrhoids 8/22/2017    Hypertension     Pain     Palpitations     Tooth wear Former Smoker     Packs/day: 0.25     Years: 5.00     Types: Cigarettes     Quit date: 7/25/1977    Smokeless tobacco: Never Used      Comment: QUIT SMOKING YRS AGO 1970's    Alcohol use 0.6 oz/week     1 Glasses of wine per week      Comment: OCC WINE    Drug use: No    Sexual activity: No      Comment:  passed away 21yrs agp     Other Topics Concern    Not on file     Social History Narrative    No narrative on file       Family History:  Family History   Problem Relation Age of Onset    Cancer Sister         pancrease    Heart Disease Mother     Heart Disease Father     Diabetes Brother     No Known Problems Maternal Grandmother     No Known Problems Maternal Grandfather     No Known Problems Paternal Grandmother     No Known Problems Paternal Grandfather          REVIEW OF SYSTEMS:  Review of Systems   Constitutional: Negative for chills and fever. Respiratory: Negative for cough. Gastrointestinal: Negative for constipation, diarrhea and nausea. Musculoskeletal: Positive for arthralgias (right knee, right hip) and joint swelling (right knee). Negative for gait problem and myalgias. Neurological: Negative for dizziness, weakness and numbness. I have reviewed the CC, HPI, ROS, PMH, FHX, Social History. I agree with the documentation provided by other staff, residents, and/or medical students and have reviewed their documentation prior to providing my signature indicating agreement. PHYSICAL EXAM:  Ht 5' 7\" (1.702 m)   Wt 206 lb (93.4 kg)   BMI 32.26 kg/m²  Body mass index is 32.26 kg/m². Physical Exam  Gen: alert and oriented  Psych:  Appropriate affect; Appropriate knowledge base; Appropriate mood; No hallucinations; Head: normocephalic atraumatic   Chest: symmetric chest excursion  Pelvis: stable  Ortho Exam  Extremity: Evaluation of the Right knee reveals no significant outward deformity. There is no erythema, warmth, skin lesions, signs of infection.   There is  In such instances, actual meaning can be extrapolated by contextual diversion                Electronically signed by Vira Hernandez MA, [place] Hauser on 10/5/2018 at 2:05 PM

## 2018-10-13 ENCOUNTER — HOSPITAL ENCOUNTER (OUTPATIENT)
Dept: MRI IMAGING | Age: 73
Discharge: HOME | End: 2018-10-15
Payer: MEDICARE

## 2018-10-13 DIAGNOSIS — Q79.9 BONY ABNORMALITY: ICD-10-CM

## 2018-10-13 DIAGNOSIS — M16.11 ARTHRITIS OF RIGHT HIP: ICD-10-CM

## 2018-10-13 LAB
CREAT SERPL-MCNC: 0.71 MG/DL (ref 0.5–0.9)
GFR AFRICAN AMERICAN: >60 ML/MIN
GFR NON-AFRICAN AMERICAN: >60 ML/MIN
GFR SERPL CREATININE-BSD FRML MDRD: NORMAL ML/MIN/{1.73_M2}
GFR SERPL CREATININE-BSD FRML MDRD: NORMAL ML/MIN/{1.73_M2}

## 2018-10-13 PROCEDURE — 82565 ASSAY OF CREATININE: CPT

## 2018-10-13 PROCEDURE — 73721 MRI JNT OF LWR EXTRE W/O DYE: CPT

## 2018-10-13 PROCEDURE — 36415 COLL VENOUS BLD VENIPUNCTURE: CPT

## 2018-10-15 ENCOUNTER — HOSPITAL ENCOUNTER (OUTPATIENT)
Dept: PHYSICAL THERAPY | Facility: CLINIC | Age: 73
Setting detail: THERAPIES SERIES
Discharge: HOME | End: 2018-10-15
Payer: MEDICARE

## 2018-10-15 NOTE — FLOWSHEET NOTE
[] [address]  [address]    [phone]  F: [phone] [x] [address] [facility] [address]   [address]  P: [phone]  F: [phone] [] [place]  [address] [facility]  P: [phone]  F: [phone] [] [address]  [facility]   [address]   [place]: [phone]  F: [phone] [] [facility]) [facility]  [address] [facility] [address]  [place]: [phone]   F: [phone]      Physical Therapy Cancel/No Show note    Date: 10/15/2018  Patient: Sunita Oates  : 1945  MRN: [id number]    Cancels/No Shows to date:     For today's appointment patient:  [x]  Cancelled  []  Rescheduled appointment  []  No-show     Reason given by patient:  []  Patient ill  []  Conflicting appointment  []  No transportation    []  Conflict with work  [x]  reason given: Pt not having any pain  []  Weather related  [x]  Other:      Comments:  Patient arrived for physical therapy but stated she has not had any pain since being fitted for R knee  brace. She  questioned the need for physical therapy since she is pain free. She wishes to wait on physical therapy pending her follow up in ortho on 10/19/18.      []  Next appointment was confirmed    Electronically signed by: Ayush Avila PT

## 2018-10-19 ENCOUNTER — OFFICE VISIT (OUTPATIENT)
Dept: ORTHOPEDIC SURGERY | Age: 73
End: 2018-10-19
Payer: MEDICARE

## 2018-10-19 VITALS — WEIGHT: 206 LBS | BODY MASS INDEX: 32.33 KG/M2 | HEIGHT: 67 IN

## 2018-10-19 DIAGNOSIS — S73.191D TEAR OF RIGHT ACETABULAR LABRUM, SUBSEQUENT ENCOUNTER: ICD-10-CM

## 2018-10-19 DIAGNOSIS — M17.11 ARTHRITIS OF RIGHT KNEE: Primary | ICD-10-CM

## 2018-10-19 DIAGNOSIS — M17.12 ARTHRITIS OF LEFT KNEE: ICD-10-CM

## 2018-10-19 PROCEDURE — 1101F PT FALLS ASSESS-DOCD LE1/YR: CPT | Performed by: ORTHOPAEDIC SURGERY

## 2018-10-19 PROCEDURE — G8399 PT W/DXA RESULTS DOCUMENT: HCPCS | Performed by: ORTHOPAEDIC SURGERY

## 2018-10-19 PROCEDURE — 1123F ACP DISCUSS/DSCN MKR DOCD: CPT | Performed by: ORTHOPAEDIC SURGERY

## 2018-10-19 PROCEDURE — 3014F SCREEN MAMMO DOC REV: CPT | Performed by: ORTHOPAEDIC SURGERY

## 2018-10-19 PROCEDURE — 4040F PNEUMOC VAC/ADMIN/RCVD: CPT | Performed by: ORTHOPAEDIC SURGERY

## 2018-10-19 PROCEDURE — 99213 OFFICE O/P EST LOW 20 MIN: CPT | Performed by: ORTHOPAEDIC SURGERY

## 2018-10-19 PROCEDURE — 3017F COLORECTAL CA SCREEN DOC REV: CPT | Performed by: ORTHOPAEDIC SURGERY

## 2018-10-19 PROCEDURE — G8417 CALC BMI ABV UP PARAM F/U: HCPCS | Performed by: ORTHOPAEDIC SURGERY

## 2018-10-19 PROCEDURE — 1036F TOBACCO NON-USER: CPT | Performed by: ORTHOPAEDIC SURGERY

## 2018-10-19 PROCEDURE — G8484 FLU IMMUNIZE NO ADMIN: HCPCS | Performed by: ORTHOPAEDIC SURGERY

## 2018-10-19 PROCEDURE — G8427 DOCREV CUR MEDS BY ELIG CLIN: HCPCS | Performed by: ORTHOPAEDIC SURGERY

## 2018-10-19 PROCEDURE — 1090F PRES/ABSN URINE INCON ASSESS: CPT | Performed by: ORTHOPAEDIC SURGERY

## 2018-10-19 ASSESSMENT — ENCOUNTER SYMPTOMS
ABDOMINAL PAIN: 0
CHEST TIGHTNESS: 0
VOMITING: 0
COUGH: 0
APNEA: 0

## 2018-10-19 NOTE — PROGRESS NOTES
[address] [place] [address] Terese Joshi [address]  Dept: [phone]  Dept Fax: [phone]        Ambulatory Follow Up      Subjective:   Wilman William is a 75y.o. year old female who presents to our office today for routine followup regarding her   1. Arthritis of right knee    2. Tear of right acetabular labrum, subsequent encounter    3. Arthritis of left knee    . Chief Complaint   Patient presents with    Hip Pain     right    Knee Pain     bilateral       HPI- Wilman William  is a 75 y. o.female who presents today in follow for right hip and right knee pain. The patient was last seen on 10/5/2018 and underwent treatment in the form of MRI of right hip with contrast, Right knee lateral  brace, and physical therapy. The patient notes 60% improvement with the previous treatment. When the patient went to get her MRI she was having a hard time so she did not complete the MRI with the contrast. The patient only completed The MRI without contrast. The patient states that after wearing her brace to her right knee continually she has seen improved pain in her right knee and right hip. The patient does not wish to have further treatment at this time but wanted to review her MRI results. Review of Systems   Constitutional: Negative for chills and fever. Respiratory: Negative for apnea, cough and chest tightness. Cardiovascular: Negative for chest pain and palpitations. Gastrointestinal: Negative for abdominal pain and vomiting. Genitourinary: Negative for difficulty urinating. Musculoskeletal: Positive for arthralgias (right hip, right knee). Negative for gait problem, joint swelling and myalgias. Neurological: Negative for dizziness, weakness and numbness. I have reviewed the CC, HPI, ROS, PMH, FHX, Social History, and if not present in this note, I have reviewed in the patient's chart.    I agree with the documentation provided inability to tolerate the remainder of the image sequences. 1. Mild degenerative changes of the bilateral hips. 2. Tear of the anterior superior right labrum. 3. No acute osseous abnormality identified. 4. Postsurgical changes and spondylosis of the partially imaged lumbar spine. Assessment:      1. Arthritis of right knee    2. Tear of right acetabular labrum, subsequent encounter    3. Arthritis of left knee       Plan:     Since the patient is doing so well with wearing the lateral un- brace she should continue to do her normal activities. Patient to finish out physical therapy and transfer to a home exercise program. Patient to continue with her ibuprofen. Patient can follow up as needed for her right hip and bilateral knees. Call the office with any questions or concerns. Follow up:Return if symptoms worsen or fail to improve. No orders of the defined types were placed in this encounter. No orders of the defined types were placed in this encounter. Iam Schmid LPN am scribing for and in the presence of Dr. Ashley Solis  10/21/2018 7:23 PM     I have reviewed and made changes accordingly to the work scribed by Bruce Reddy LPN. The documentation accurately reflects work and decisions made by me. I have also reviewed documentation completed by clinical staff.     Ashley Solis DO, [facility]  10/21/2018 7:24 PM     This note is created with the assistance of a speech recognition program.  While intending to generate a document that actually reflects the content of the visit, the document can still have some errors including those of syntax and sound a like substitutions which may escape proof reading.  In such instances, actual meaning can be extrapolated by contextual diversion        Electronically signed by Angelina Myers DO, FAOAO on 10/21/2018 at 7:23 PM

## 2018-10-24 ENCOUNTER — OFFICE VISIT (OUTPATIENT)
Dept: FAMILY MEDICINE CLINIC | Age: 73
End: 2018-10-24
Payer: MEDICARE

## 2018-10-24 VITALS
HEART RATE: 54 BPM | HEIGHT: 67 IN | TEMPERATURE: 96.7 F | DIASTOLIC BLOOD PRESSURE: 68 MMHG | OXYGEN SATURATION: 98 % | BODY MASS INDEX: 32.33 KG/M2 | WEIGHT: 206 LBS | SYSTOLIC BLOOD PRESSURE: 110 MMHG

## 2018-10-24 DIAGNOSIS — Z00.00 ROUTINE GENERAL MEDICAL EXAMINATION AT A HEALTH CARE FACILITY: ICD-10-CM

## 2018-10-24 DIAGNOSIS — B36.9 FUNGAL DERMATITIS: Primary | ICD-10-CM

## 2018-10-24 DIAGNOSIS — Z13.220 SCREENING FOR LIPID DISORDERS: ICD-10-CM

## 2018-10-24 PROCEDURE — G8399 PT W/DXA RESULTS DOCUMENT: HCPCS | Performed by: NURSE PRACTITIONER

## 2018-10-24 PROCEDURE — G8484 FLU IMMUNIZE NO ADMIN: HCPCS | Performed by: NURSE PRACTITIONER

## 2018-10-24 PROCEDURE — G8427 DOCREV CUR MEDS BY ELIG CLIN: HCPCS | Performed by: NURSE PRACTITIONER

## 2018-10-24 PROCEDURE — 3017F COLORECTAL CA SCREEN DOC REV: CPT | Performed by: NURSE PRACTITIONER

## 2018-10-24 PROCEDURE — 1090F PRES/ABSN URINE INCON ASSESS: CPT | Performed by: NURSE PRACTITIONER

## 2018-10-24 PROCEDURE — G8417 CALC BMI ABV UP PARAM F/U: HCPCS | Performed by: NURSE PRACTITIONER

## 2018-10-24 PROCEDURE — 1036F TOBACCO NON-USER: CPT | Performed by: NURSE PRACTITIONER

## 2018-10-24 PROCEDURE — 1101F PT FALLS ASSESS-DOCD LE1/YR: CPT | Performed by: NURSE PRACTITIONER

## 2018-10-24 PROCEDURE — 1123F ACP DISCUSS/DSCN MKR DOCD: CPT | Performed by: NURSE PRACTITIONER

## 2018-10-24 PROCEDURE — 4040F PNEUMOC VAC/ADMIN/RCVD: CPT | Performed by: NURSE PRACTITIONER

## 2018-10-24 PROCEDURE — 3014F SCREEN MAMMO DOC REV: CPT | Performed by: NURSE PRACTITIONER

## 2018-10-24 PROCEDURE — 99213 OFFICE O/P EST LOW 20 MIN: CPT | Performed by: NURSE PRACTITIONER

## 2018-10-24 ASSESSMENT — ENCOUNTER SYMPTOMS
COUGH: 0
SHORTNESS OF BREATH: 0

## 2018-10-24 NOTE — PROGRESS NOTES
10/24/2019    CBC     Standing Status:   Future     Standing Expiration Date:   10/24/2019   Suha Stacy MD, Dermatology [facility]     Referral Priority:   Routine     Referral Type:   Eval and Treat     Referral Reason:   Specialty Services Required     Referred to Provider:   Fe Olguin MD     Requested Specialty:   Dermatology     Number of Visits Requested:   1     Fu with derm for second opinion, failed diflucan x 7 days, nystatin powder and Lotrisone, she still has itching despite keeping area clean, dry and using hypoallergenic soap  Update other labs  Will call with test results      Patient given educational materials - see patient instructions. Discussed use,benefit, and side effects of prescribed medications. All patient questions answered. Pt voiced understanding. Reviewed health maintenance. Instructed to continue currentmedications, diet and exercise.     Electronically signed by Nagi Mckinnon CNP on 10/24/2018 at 3:58 PM

## 2018-11-07 ENCOUNTER — OFFICE VISIT (OUTPATIENT)
Dept: DERMATOLOGY | Age: 73
End: 2018-11-07
Payer: MEDICARE

## 2018-11-07 VITALS
OXYGEN SATURATION: 98 % | SYSTOLIC BLOOD PRESSURE: 129 MMHG | HEART RATE: 46 BPM | BODY MASS INDEX: 32.77 KG/M2 | DIASTOLIC BLOOD PRESSURE: 59 MMHG | WEIGHT: 208.8 LBS | HEIGHT: 67 IN | TEMPERATURE: 99 F

## 2018-11-07 DIAGNOSIS — L28.0 LICHEN SIMPLEX CHRONICUS: ICD-10-CM

## 2018-11-07 DIAGNOSIS — L30.4 INTERTRIGO: Primary | ICD-10-CM

## 2018-11-07 PROCEDURE — G8427 DOCREV CUR MEDS BY ELIG CLIN: HCPCS | Performed by: DERMATOLOGY

## 2018-11-07 PROCEDURE — 4040F PNEUMOC VAC/ADMIN/RCVD: CPT | Performed by: DERMATOLOGY

## 2018-11-07 PROCEDURE — 3014F SCREEN MAMMO DOC REV: CPT | Performed by: DERMATOLOGY

## 2018-11-07 PROCEDURE — 1036F TOBACCO NON-USER: CPT | Performed by: DERMATOLOGY

## 2018-11-07 PROCEDURE — G8399 PT W/DXA RESULTS DOCUMENT: HCPCS | Performed by: DERMATOLOGY

## 2018-11-07 PROCEDURE — 3017F COLORECTAL CA SCREEN DOC REV: CPT | Performed by: DERMATOLOGY

## 2018-11-07 PROCEDURE — 1123F ACP DISCUSS/DSCN MKR DOCD: CPT | Performed by: DERMATOLOGY

## 2018-11-07 PROCEDURE — 1090F PRES/ABSN URINE INCON ASSESS: CPT | Performed by: DERMATOLOGY

## 2018-11-07 PROCEDURE — G8417 CALC BMI ABV UP PARAM F/U: HCPCS | Performed by: DERMATOLOGY

## 2018-11-07 PROCEDURE — G8484 FLU IMMUNIZE NO ADMIN: HCPCS | Performed by: DERMATOLOGY

## 2018-11-07 PROCEDURE — 1101F PT FALLS ASSESS-DOCD LE1/YR: CPT | Performed by: DERMATOLOGY

## 2018-11-07 PROCEDURE — 99202 OFFICE O/P NEW SF 15 MIN: CPT | Performed by: DERMATOLOGY

## 2018-11-07 RX ORDER — KETOCONAZOLE 20 MG/G
CREAM TOPICAL
Qty: 60 G | Refills: 2 | Status: SHIPPED | OUTPATIENT
Start: 2018-11-07 | End: 2018-12-19 | Stop reason: SDUPTHER

## 2018-11-08 ENCOUNTER — HOSPITAL ENCOUNTER (OUTPATIENT)
Age: 73
Discharge: HOME | End: 2018-11-08
Payer: MEDICARE

## 2018-11-08 DIAGNOSIS — Z13.220 SCREENING FOR LIPID DISORDERS: ICD-10-CM

## 2018-11-08 DIAGNOSIS — Z00.00 ROUTINE GENERAL MEDICAL EXAMINATION AT A HEALTH CARE FACILITY: ICD-10-CM

## 2018-11-08 LAB
ALBUMIN SERPL-MCNC: 4.1 G/DL (ref 3.5–5.2)
ALBUMIN/GLOBULIN RATIO: 1.2 (ref 1–2.5)
ALP BLD-CCNC: 67 U/L (ref 35–104)
ALT SERPL-CCNC: 8 U/L (ref 5–33)
ANION GAP SERPL CALCULATED.3IONS-SCNC: 10 MMOL/L (ref 9–17)
AST SERPL-CCNC: 14 U/L
BILIRUB SERPL-MCNC: 1.03 MG/DL (ref 0.3–1.2)
BUN BLDV-MCNC: 11 MG/DL (ref 8–23)
BUN/CREAT BLD: ABNORMAL (ref 9–20)
CALCIUM SERPL-MCNC: 9.3 MG/DL (ref 8.6–10.4)
CHLORIDE BLD-SCNC: 101 MMOL/L (ref 98–107)
CHOLESTEROL, FASTING: 194 MG/DL
CHOLESTEROL/HDL RATIO: 3.5
CO2: 29 MMOL/L (ref 20–31)
CREAT SERPL-MCNC: 0.68 MG/DL (ref 0.5–0.9)
GFR AFRICAN AMERICAN: >60 ML/MIN
GFR NON-AFRICAN AMERICAN: >60 ML/MIN
GFR SERPL CREATININE-BSD FRML MDRD: ABNORMAL ML/MIN/{1.73_M2}
GFR SERPL CREATININE-BSD FRML MDRD: ABNORMAL ML/MIN/{1.73_M2}
GLUCOSE BLD-MCNC: 111 MG/DL (ref 70–99)
HCT VFR BLD CALC: 35.4 % (ref 36.3–47.1)
HDLC SERPL-MCNC: 56 MG/DL
HEMOGLOBIN: 12.3 G/DL (ref 11.9–15.1)
LDL CHOLESTEROL: 121 MG/DL (ref 0–130)
MCH RBC QN AUTO: 29.4 PG (ref 25.2–33.5)
MCHC RBC AUTO-ENTMCNC: 34.7 G/DL (ref 28.4–34.8)
MCV RBC AUTO: 84.7 FL (ref 82.6–102.9)
NRBC AUTOMATED: 0 PER 100 WBC
PDW BLD-RTO: 12.4 % (ref 11.8–14.4)
PLATELET # BLD: 302 K/UL (ref 138–453)
PMV BLD AUTO: 10.7 FL (ref 8.1–13.5)
POTASSIUM SERPL-SCNC: 3.3 MMOL/L (ref 3.7–5.3)
RBC # BLD: 4.18 M/UL (ref 3.95–5.11)
SODIUM BLD-SCNC: 140 MMOL/L (ref 135–144)
TOTAL PROTEIN: 7.4 G/DL (ref 6.4–8.3)
TRIGLYCERIDE, FASTING: 87 MG/DL
VLDLC SERPL CALC-MCNC: NORMAL MG/DL (ref 1–30)
WBC # BLD: 6.9 K/UL (ref 3.5–11.3)

## 2018-11-08 PROCEDURE — 36415 COLL VENOUS BLD VENIPUNCTURE: CPT

## 2018-11-08 PROCEDURE — 85027 COMPLETE CBC AUTOMATED: CPT

## 2018-11-08 PROCEDURE — 80061 LIPID PANEL: CPT

## 2018-11-08 PROCEDURE — 80053 COMPREHEN METABOLIC PANEL: CPT

## 2018-11-08 RX ORDER — IBUPROFEN 800 MG/1
TABLET ORAL
Qty: 60 TABLET | Refills: 0 | Status: SHIPPED | OUTPATIENT
Start: 2018-11-08 | End: 2019-03-11 | Stop reason: SDUPTHER

## 2018-11-08 RX ORDER — NEBIVOLOL 10 MG/1
TABLET ORAL
Qty: 270 TABLET | Refills: 1 | Status: SHIPPED | OUTPATIENT
Start: 2018-11-08 | End: 2019-04-16

## 2018-11-12 ENCOUNTER — TELEPHONE (OUTPATIENT)
Dept: DERMATOLOGY | Age: 73
End: 2018-11-12

## 2018-11-12 NOTE — TELEPHONE ENCOUNTER
Pt went to her Cardiologist and he states that its highly likely that the Hydrocortisone caused the Palpitations.   She will d/c the Hydrocortisone and only use the Ketoconazole cream

## 2018-11-19 DIAGNOSIS — Z12.39 SCREENING FOR BREAST CANCER: ICD-10-CM

## 2018-12-19 ENCOUNTER — OFFICE VISIT (OUTPATIENT)
Dept: DERMATOLOGY | Age: 73
End: 2018-12-19
Payer: MEDICARE

## 2018-12-19 VITALS
HEIGHT: 67 IN | BODY MASS INDEX: 32.74 KG/M2 | SYSTOLIC BLOOD PRESSURE: 128 MMHG | OXYGEN SATURATION: 99 % | WEIGHT: 208.6 LBS | DIASTOLIC BLOOD PRESSURE: 78 MMHG | HEART RATE: 48 BPM

## 2018-12-19 DIAGNOSIS — L30.4 INTERTRIGO: Primary | ICD-10-CM

## 2018-12-19 DIAGNOSIS — L28.0 LICHEN SIMPLEX CHRONICUS: ICD-10-CM

## 2018-12-19 PROCEDURE — G8484 FLU IMMUNIZE NO ADMIN: HCPCS | Performed by: DERMATOLOGY

## 2018-12-19 PROCEDURE — 3017F COLORECTAL CA SCREEN DOC REV: CPT | Performed by: DERMATOLOGY

## 2018-12-19 PROCEDURE — G8427 DOCREV CUR MEDS BY ELIG CLIN: HCPCS | Performed by: DERMATOLOGY

## 2018-12-19 PROCEDURE — G8417 CALC BMI ABV UP PARAM F/U: HCPCS | Performed by: DERMATOLOGY

## 2018-12-19 PROCEDURE — G8399 PT W/DXA RESULTS DOCUMENT: HCPCS | Performed by: DERMATOLOGY

## 2018-12-19 PROCEDURE — 1123F ACP DISCUSS/DSCN MKR DOCD: CPT | Performed by: DERMATOLOGY

## 2018-12-19 PROCEDURE — 99213 OFFICE O/P EST LOW 20 MIN: CPT | Performed by: DERMATOLOGY

## 2018-12-19 PROCEDURE — 3014F SCREEN MAMMO DOC REV: CPT | Performed by: DERMATOLOGY

## 2018-12-19 PROCEDURE — 4040F PNEUMOC VAC/ADMIN/RCVD: CPT | Performed by: DERMATOLOGY

## 2018-12-19 PROCEDURE — 1036F TOBACCO NON-USER: CPT | Performed by: DERMATOLOGY

## 2018-12-19 PROCEDURE — 1101F PT FALLS ASSESS-DOCD LE1/YR: CPT | Performed by: DERMATOLOGY

## 2018-12-19 PROCEDURE — 1090F PRES/ABSN URINE INCON ASSESS: CPT | Performed by: DERMATOLOGY

## 2018-12-19 RX ORDER — KETOCONAZOLE 20 MG/G
CREAM TOPICAL
Qty: 60 G | Refills: 5 | Status: SHIPPED | OUTPATIENT
Start: 2018-12-19 | End: 2019-04-16 | Stop reason: SDUPTHER

## 2018-12-19 RX ORDER — PIMECROLIMUS 10 MG/G
CREAM TOPICAL
Qty: 60 G | Refills: 5 | Status: SHIPPED | OUTPATIENT
Start: 2018-12-19 | End: 2019-04-16 | Stop reason: SDUPTHER

## 2018-12-19 NOTE — PATIENT INSTRUCTIONS
1. Apply Elidel to the inner pit of arm and under breast once a day    2. Apply ketoconazole to active rash areas   3.  Follow up in 4 Months

## 2018-12-19 NOTE — PROGRESS NOTES
Dermatology Patient Note  [facility] DERMAT[facility]  [address] [facility]  [address] [place] [address]  Dept: [phone]  Dept Fax: [phone]      VISITDATE: 12/19/2018   REFERRING PROVIDER: No ref. provider found      Leonel Villalobos is a 75 y.o. female  who presents today in the office for:    Follow-up (Pt states rash is clearing with hydrocortisone and ketoconazole, itching under left breast but there is still some rash under right breast )      HISTORY OF PRESENT ILLNESS:  Patients presents for f/u intertrigo  Interval history: improved, starla under left breast but left with some dark rash. Still has some itchy bumps under right breast  Patient assessment of progress: near complete and adequate  Current treatment and adherence: ketoconazole cream qam, hydrocortisone qpm, and diaper paste. Patient had some palpitations and was told by her cardiologist that could be related to topical hydrocortisone. However she continued it and they seemed to subside somewhat  Prior treatments tried: see initial HPI  Complains of new itch bumps of left inner elbow      CURRENT MEDICATIONS:   Current Outpatient Prescriptions   Medication Sig Dispense Refill    pimecrolimus (ELIDEL) 1 % cream Apply to skin folds qpm 60 g 5    ketoconazole (NIZORAL) 2 % cream Apply qam to rash in body folds 60 g 5    nebivolol (BYSTOLIC) 10 MG tablet TAKE 1 TABLET IN THE MORNING AND 2 TABLETS AT NIGHT 270 tablet 1     MG tablet TAKE ONE TABLET BY MOUTH TWICE A DAY AS NEEDED FOR PAIN 60 tablet 0    hydrocortisone 2.5 % cream Apply qpm to rash in body folds 60 g 5    Misc.  Devices MISC Lateral  knee brace for the right knee 1 Device 0    cloNIDine (CATAPRES) 0.2 MG/24HR APPLY 1 PATCH WEEKLY 12 patch 2    omeprazole (PRILOSEC) 20 MG delayed release capsule TAKE 1 CAPSULE DAILY 90 capsule 1    hydrocortisone-pramoxine (ANALPRAM-HC) 2.5-1 % rectal cream Place rectally 3 times daily 30 g 0    polyethylene glycol

## 2019-01-06 DIAGNOSIS — L28.0 LICHEN SIMPLEX CHRONICUS: ICD-10-CM

## 2019-01-06 DIAGNOSIS — L30.4 INTERTRIGO: ICD-10-CM

## 2019-01-07 RX ORDER — OMEPRAZOLE 20 MG/1
CAPSULE, DELAYED RELEASE ORAL
Qty: 90 CAPSULE | Refills: 1 | Status: SHIPPED | OUTPATIENT
Start: 2019-01-07 | End: 2019-01-10 | Stop reason: SDUPTHER

## 2019-01-11 RX ORDER — OMEPRAZOLE 20 MG/1
CAPSULE, DELAYED RELEASE ORAL
Qty: 90 CAPSULE | Refills: 1 | Status: SHIPPED | OUTPATIENT
Start: 2019-01-11 | End: 2019-04-16

## 2019-02-12 ENCOUNTER — OFFICE VISIT (OUTPATIENT)
Dept: DERMATOLOGY | Age: 74
End: 2019-02-12
Payer: MEDICARE

## 2019-02-12 VITALS
BODY MASS INDEX: 32.77 KG/M2 | SYSTOLIC BLOOD PRESSURE: 163 MMHG | OXYGEN SATURATION: 98 % | HEART RATE: 45 BPM | WEIGHT: 208.8 LBS | HEIGHT: 67 IN | DIASTOLIC BLOOD PRESSURE: 93 MMHG

## 2019-02-12 DIAGNOSIS — L20.84 INTRINSIC ATOPIC DERMATITIS: Primary | ICD-10-CM

## 2019-02-12 PROCEDURE — G8399 PT W/DXA RESULTS DOCUMENT: HCPCS | Performed by: DERMATOLOGY

## 2019-02-12 PROCEDURE — 1123F ACP DISCUSS/DSCN MKR DOCD: CPT | Performed by: DERMATOLOGY

## 2019-02-12 PROCEDURE — 3017F COLORECTAL CA SCREEN DOC REV: CPT | Performed by: DERMATOLOGY

## 2019-02-12 PROCEDURE — G8484 FLU IMMUNIZE NO ADMIN: HCPCS | Performed by: DERMATOLOGY

## 2019-02-12 PROCEDURE — G8417 CALC BMI ABV UP PARAM F/U: HCPCS | Performed by: DERMATOLOGY

## 2019-02-12 PROCEDURE — G8427 DOCREV CUR MEDS BY ELIG CLIN: HCPCS | Performed by: DERMATOLOGY

## 2019-02-12 PROCEDURE — 1036F TOBACCO NON-USER: CPT | Performed by: DERMATOLOGY

## 2019-02-12 PROCEDURE — 1090F PRES/ABSN URINE INCON ASSESS: CPT | Performed by: DERMATOLOGY

## 2019-02-12 PROCEDURE — 3014F SCREEN MAMMO DOC REV: CPT | Performed by: DERMATOLOGY

## 2019-02-12 PROCEDURE — 4040F PNEUMOC VAC/ADMIN/RCVD: CPT | Performed by: DERMATOLOGY

## 2019-02-12 PROCEDURE — 1101F PT FALLS ASSESS-DOCD LE1/YR: CPT | Performed by: DERMATOLOGY

## 2019-02-12 PROCEDURE — 99214 OFFICE O/P EST MOD 30 MIN: CPT | Performed by: DERMATOLOGY

## 2019-03-07 ENCOUNTER — OFFICE VISIT (OUTPATIENT)
Dept: FAMILY MEDICINE CLINIC | Age: 74
End: 2019-03-07
Payer: MEDICARE

## 2019-03-07 VITALS
OXYGEN SATURATION: 98 % | DIASTOLIC BLOOD PRESSURE: 68 MMHG | WEIGHT: 205 LBS | HEART RATE: 58 BPM | BODY MASS INDEX: 32.18 KG/M2 | SYSTOLIC BLOOD PRESSURE: 126 MMHG | HEIGHT: 67 IN | TEMPERATURE: 95.7 F

## 2019-03-07 DIAGNOSIS — B02.8 HERPES ZOSTER WITH COMPLICATION: Primary | ICD-10-CM

## 2019-03-07 PROCEDURE — G8427 DOCREV CUR MEDS BY ELIG CLIN: HCPCS | Performed by: NURSE PRACTITIONER

## 2019-03-07 PROCEDURE — 1101F PT FALLS ASSESS-DOCD LE1/YR: CPT | Performed by: NURSE PRACTITIONER

## 2019-03-07 PROCEDURE — G8484 FLU IMMUNIZE NO ADMIN: HCPCS | Performed by: NURSE PRACTITIONER

## 2019-03-07 PROCEDURE — G8399 PT W/DXA RESULTS DOCUMENT: HCPCS | Performed by: NURSE PRACTITIONER

## 2019-03-07 PROCEDURE — 4040F PNEUMOC VAC/ADMIN/RCVD: CPT | Performed by: NURSE PRACTITIONER

## 2019-03-07 PROCEDURE — G8417 CALC BMI ABV UP PARAM F/U: HCPCS | Performed by: NURSE PRACTITIONER

## 2019-03-07 PROCEDURE — 99213 OFFICE O/P EST LOW 20 MIN: CPT | Performed by: NURSE PRACTITIONER

## 2019-03-07 PROCEDURE — 3017F COLORECTAL CA SCREEN DOC REV: CPT | Performed by: NURSE PRACTITIONER

## 2019-03-07 PROCEDURE — 1090F PRES/ABSN URINE INCON ASSESS: CPT | Performed by: NURSE PRACTITIONER

## 2019-03-07 PROCEDURE — 3014F SCREEN MAMMO DOC REV: CPT | Performed by: NURSE PRACTITIONER

## 2019-03-07 PROCEDURE — 1036F TOBACCO NON-USER: CPT | Performed by: NURSE PRACTITIONER

## 2019-03-07 PROCEDURE — 1123F ACP DISCUSS/DSCN MKR DOCD: CPT | Performed by: NURSE PRACTITIONER

## 2019-03-07 RX ORDER — GABAPENTIN 100 MG/1
100 CAPSULE ORAL 2 TIMES DAILY
Qty: 60 CAPSULE | Refills: 0 | Status: SHIPPED | OUTPATIENT
Start: 2019-03-07 | End: 2019-04-16 | Stop reason: ALTCHOICE

## 2019-03-07 RX ORDER — VALACYCLOVIR HYDROCHLORIDE 1 G/1
2000 TABLET, FILM COATED ORAL 2 TIMES DAILY
Qty: 28 TABLET | Refills: 0 | Status: SHIPPED | OUTPATIENT
Start: 2019-03-07 | End: 2019-03-14

## 2019-03-07 ASSESSMENT — PATIENT HEALTH QUESTIONNAIRE - PHQ9
2. FEELING DOWN, DEPRESSED OR HOPELESS: 0
SUM OF ALL RESPONSES TO PHQ QUESTIONS 1-9: 0
1. LITTLE INTEREST OR PLEASURE IN DOING THINGS: 0
SUM OF ALL RESPONSES TO PHQ9 QUESTIONS 1 & 2: 0
SUM OF ALL RESPONSES TO PHQ QUESTIONS 1-9: 0

## 2019-03-07 ASSESSMENT — ENCOUNTER SYMPTOMS
NAUSEA: 0
CHEST TIGHTNESS: 0
VOMITING: 0
ABDOMINAL PAIN: 0
SHORTNESS OF BREATH: 0
COUGH: 0

## 2019-03-11 ENCOUNTER — TELEPHONE (OUTPATIENT)
Dept: FAMILY MEDICINE CLINIC | Age: 74
End: 2019-03-11

## 2019-03-11 RX ORDER — IBUPROFEN 800 MG/1
800 TABLET ORAL 2 TIMES DAILY PRN
Qty: 60 TABLET | Refills: 1 | Status: SHIPPED | OUTPATIENT
Start: 2019-03-11 | End: 2019-06-26 | Stop reason: ALTCHOICE

## 2019-03-13 ENCOUNTER — TELEPHONE (OUTPATIENT)
Dept: FAMILY MEDICINE CLINIC | Age: 74
End: 2019-03-13

## 2019-03-14 ENCOUNTER — OFFICE VISIT (OUTPATIENT)
Dept: FAMILY MEDICINE CLINIC | Age: 74
End: 2019-03-14
Payer: MEDICARE

## 2019-03-14 VITALS
HEART RATE: 52 BPM | DIASTOLIC BLOOD PRESSURE: 65 MMHG | SYSTOLIC BLOOD PRESSURE: 118 MMHG | WEIGHT: 205 LBS | TEMPERATURE: 97.1 F | OXYGEN SATURATION: 98 % | HEIGHT: 67 IN | BODY MASS INDEX: 32.18 KG/M2

## 2019-03-14 DIAGNOSIS — B02.9 HERPES ZOSTER WITHOUT COMPLICATION: Primary | ICD-10-CM

## 2019-03-14 DIAGNOSIS — L29.9 ITCHING: ICD-10-CM

## 2019-03-14 PROCEDURE — G8417 CALC BMI ABV UP PARAM F/U: HCPCS | Performed by: NURSE PRACTITIONER

## 2019-03-14 PROCEDURE — 3014F SCREEN MAMMO DOC REV: CPT | Performed by: NURSE PRACTITIONER

## 2019-03-14 PROCEDURE — 1101F PT FALLS ASSESS-DOCD LE1/YR: CPT | Performed by: NURSE PRACTITIONER

## 2019-03-14 PROCEDURE — G8427 DOCREV CUR MEDS BY ELIG CLIN: HCPCS | Performed by: NURSE PRACTITIONER

## 2019-03-14 PROCEDURE — 3017F COLORECTAL CA SCREEN DOC REV: CPT | Performed by: NURSE PRACTITIONER

## 2019-03-14 PROCEDURE — 1123F ACP DISCUSS/DSCN MKR DOCD: CPT | Performed by: NURSE PRACTITIONER

## 2019-03-14 PROCEDURE — 99213 OFFICE O/P EST LOW 20 MIN: CPT | Performed by: NURSE PRACTITIONER

## 2019-03-14 PROCEDURE — G8484 FLU IMMUNIZE NO ADMIN: HCPCS | Performed by: NURSE PRACTITIONER

## 2019-03-14 PROCEDURE — 1036F TOBACCO NON-USER: CPT | Performed by: NURSE PRACTITIONER

## 2019-03-14 PROCEDURE — 4040F PNEUMOC VAC/ADMIN/RCVD: CPT | Performed by: NURSE PRACTITIONER

## 2019-03-14 PROCEDURE — G8399 PT W/DXA RESULTS DOCUMENT: HCPCS | Performed by: NURSE PRACTITIONER

## 2019-03-14 PROCEDURE — 1090F PRES/ABSN URINE INCON ASSESS: CPT | Performed by: NURSE PRACTITIONER

## 2019-03-14 RX ORDER — RANITIDINE 300 MG/1
300 TABLET ORAL NIGHTLY
Qty: 7 TABLET | Refills: 0 | Status: SHIPPED | OUTPATIENT
Start: 2019-03-14 | End: 2019-04-16 | Stop reason: ALTCHOICE

## 2019-03-14 ASSESSMENT — ENCOUNTER SYMPTOMS
COUGH: 0
CHEST TIGHTNESS: 0
SHORTNESS OF BREATH: 0
ABDOMINAL PAIN: 0

## 2019-03-26 ENCOUNTER — TELEPHONE (OUTPATIENT)
Dept: FAMILY MEDICINE CLINIC | Age: 74
End: 2019-03-26

## 2019-03-26 ENCOUNTER — OFFICE VISIT (OUTPATIENT)
Dept: FAMILY MEDICINE CLINIC | Age: 74
End: 2019-03-26
Payer: MEDICARE

## 2019-03-26 VITALS
HEIGHT: 67 IN | WEIGHT: 208 LBS | BODY MASS INDEX: 32.65 KG/M2 | OXYGEN SATURATION: 98 % | TEMPERATURE: 96.2 F | DIASTOLIC BLOOD PRESSURE: 72 MMHG | SYSTOLIC BLOOD PRESSURE: 120 MMHG | HEART RATE: 47 BPM

## 2019-03-26 DIAGNOSIS — B02.29 POST HERPETIC NEURALGIA: Primary | ICD-10-CM

## 2019-03-26 PROCEDURE — G8399 PT W/DXA RESULTS DOCUMENT: HCPCS | Performed by: NURSE PRACTITIONER

## 2019-03-26 PROCEDURE — 1101F PT FALLS ASSESS-DOCD LE1/YR: CPT | Performed by: NURSE PRACTITIONER

## 2019-03-26 PROCEDURE — 4040F PNEUMOC VAC/ADMIN/RCVD: CPT | Performed by: NURSE PRACTITIONER

## 2019-03-26 PROCEDURE — 1123F ACP DISCUSS/DSCN MKR DOCD: CPT | Performed by: NURSE PRACTITIONER

## 2019-03-26 PROCEDURE — 1036F TOBACCO NON-USER: CPT | Performed by: NURSE PRACTITIONER

## 2019-03-26 PROCEDURE — 3017F COLORECTAL CA SCREEN DOC REV: CPT | Performed by: NURSE PRACTITIONER

## 2019-03-26 PROCEDURE — G8417 CALC BMI ABV UP PARAM F/U: HCPCS | Performed by: NURSE PRACTITIONER

## 2019-03-26 PROCEDURE — G8484 FLU IMMUNIZE NO ADMIN: HCPCS | Performed by: NURSE PRACTITIONER

## 2019-03-26 PROCEDURE — 3014F SCREEN MAMMO DOC REV: CPT | Performed by: NURSE PRACTITIONER

## 2019-03-26 PROCEDURE — 1090F PRES/ABSN URINE INCON ASSESS: CPT | Performed by: NURSE PRACTITIONER

## 2019-03-26 PROCEDURE — 99213 OFFICE O/P EST LOW 20 MIN: CPT | Performed by: NURSE PRACTITIONER

## 2019-03-26 PROCEDURE — G8427 DOCREV CUR MEDS BY ELIG CLIN: HCPCS | Performed by: NURSE PRACTITIONER

## 2019-03-26 RX ORDER — LIDOCAINE 50 MG/G
1 PATCH TOPICAL DAILY
Qty: 30 PATCH | Refills: 0 | Status: SHIPPED | OUTPATIENT
Start: 2019-03-26 | End: 2019-04-25

## 2019-03-26 RX ORDER — VALACYCLOVIR HYDROCHLORIDE 1 G/1
1000 TABLET, FILM COATED ORAL 2 TIMES DAILY
COMMUNITY
End: 2019-04-16 | Stop reason: ALTCHOICE

## 2019-03-26 ASSESSMENT — ENCOUNTER SYMPTOMS
COLOR CHANGE: 0
SHORTNESS OF BREATH: 0

## 2019-04-08 ENCOUNTER — OFFICE VISIT (OUTPATIENT)
Dept: FAMILY MEDICINE CLINIC | Age: 74
End: 2019-04-08
Payer: MEDICARE

## 2019-04-08 VITALS
TEMPERATURE: 97.6 F | HEART RATE: 54 BPM | WEIGHT: 212 LBS | OXYGEN SATURATION: 96 % | HEIGHT: 67 IN | BODY MASS INDEX: 33.27 KG/M2

## 2019-04-08 DIAGNOSIS — L29.9 LOCALIZED PRURITUS: Primary | ICD-10-CM

## 2019-04-08 PROCEDURE — 4040F PNEUMOC VAC/ADMIN/RCVD: CPT | Performed by: NURSE PRACTITIONER

## 2019-04-08 PROCEDURE — G8417 CALC BMI ABV UP PARAM F/U: HCPCS | Performed by: NURSE PRACTITIONER

## 2019-04-08 PROCEDURE — 1090F PRES/ABSN URINE INCON ASSESS: CPT | Performed by: NURSE PRACTITIONER

## 2019-04-08 PROCEDURE — 99213 OFFICE O/P EST LOW 20 MIN: CPT | Performed by: NURSE PRACTITIONER

## 2019-04-08 PROCEDURE — G8399 PT W/DXA RESULTS DOCUMENT: HCPCS | Performed by: NURSE PRACTITIONER

## 2019-04-08 PROCEDURE — 1036F TOBACCO NON-USER: CPT | Performed by: NURSE PRACTITIONER

## 2019-04-08 PROCEDURE — 1123F ACP DISCUSS/DSCN MKR DOCD: CPT | Performed by: NURSE PRACTITIONER

## 2019-04-08 PROCEDURE — G8427 DOCREV CUR MEDS BY ELIG CLIN: HCPCS | Performed by: NURSE PRACTITIONER

## 2019-04-08 PROCEDURE — 3017F COLORECTAL CA SCREEN DOC REV: CPT | Performed by: NURSE PRACTITIONER

## 2019-04-08 RX ORDER — HYDROXYZINE PAMOATE 25 MG/1
25 CAPSULE ORAL NIGHTLY PRN
Qty: 30 CAPSULE | Refills: 0 | Status: SHIPPED | OUTPATIENT
Start: 2019-04-08 | End: 2019-05-08

## 2019-04-08 ASSESSMENT — ENCOUNTER SYMPTOMS
NAUSEA: 0
VOMITING: 0
COLOR CHANGE: 0
NAIL CHANGES: 0
RHINORRHEA: 0
SORE THROAT: 0
SHORTNESS OF BREATH: 0
DIARRHEA: 0
COUGH: 0

## 2019-04-08 NOTE — PROGRESS NOTES
[address] [facility], [address]  [phone]      Beth Maldonado is a 75 y.o. female who presents today for her  medicalconditions/complaints as noted below. Beth Maldonado is c/o of Rash (alot of itching,mainly right arm,some on left arm and hand area )  . HPI:    Rash   This is a recurrent problem. The current episode started 1 to 4 weeks ago. The problem has been waxing and waning since onset. The affected locations include the left wrist and right arm. The rash is characterized by itchiness. Associated with: pt had shingles a few weeks ago, that rash has resolved but itching continues and is spreading. Pertinent negatives include no anorexia, cough, diarrhea, fatigue, fever, joint pain, nail changes, rhinorrhea, shortness of breath, sore throat or vomiting. Past treatments include moisturizer, topical steroids, anti-itch cream and antihistamine (zantac, coconunt oil, eucerin). The treatment provided no relief. Her past medical history is significant for eczema and varicella.        Past Medical History:   Diagnosis Date    Atrial flutter (HCC)     with palpitations    Chronic constipation     whole life    Eczema     Frequent sinus infections     GERD (gastroesophageal reflux disease)     resolved    Hemorrhoid 10/26/2016    internal and external    Hemorrhoids 8/22/2017    Hypertension     Pain     Palpitations     Tooth wear     FALSE TOOTH LOWER BOTTOM REMOVABLE PERMANENT DENTAL WORK    Wears glasses       Past Surgical History:   Procedure Laterality Date    ANUS SURGERY      fistulotomy    APPENDECTOMY  1960    BREAST BIOPSY Left 1988    COLONOSCOPY  10/26/2016    adenomatous polyps(tubular adenomas)    HYSTERECTOMY      FIBROIDS; one ovarian preservation    KNEE ARTHROSCOPY      LEFT    LUMBAR DISC SURGERY      LUMBAR FUSION  12/30/2013    Transforaminal Lumbar Interbody Fusion L4-5, S1    THYROIDECTOMY, PARTIAL      TONSILLECTOMY Family History   Problem Relation Age of Onset    Cancer Sister         pancrease    Heart Disease Mother     Heart Disease Father     Diabetes Brother     No Known Problems Maternal Grandmother     No Known Problems Maternal Grandfather     No Known Problems Paternal Grandmother     No Known Problems Paternal Grandfather      Social History     Tobacco Use    Smoking status: Former Smoker     Packs/day: 0.25     Years: 5.00     Pack years: 1.25     Types: Cigarettes     Last attempt to quit: 1977     Years since quittin.7    Smokeless tobacco: Never Used    Tobacco comment: QUIT SMOKING YRS AGO    Substance Use Topics    Alcohol use: Yes     Alcohol/week: 0.6 oz     Types: 1 Glasses of wine per week     Comment: OCC WINE      Current Outpatient Medications   Medication Sig Dispense Refill    hydrOXYzine (VISTARIL) 25 MG capsule Take 1 capsule by mouth nightly as needed for Itching 30 capsule 0    valACYclovir (VALTREX) 1 g tablet Take 1,000 mg by mouth 2 times daily      lidocaine (LIDODERM) 5 % Place 1 patch onto the skin daily 12 hours on, 12 hours off. 30 patch 0    ibuprofen (ADVIL;MOTRIN) 800 MG tablet Take 1 tablet by mouth 2 times daily as needed for Pain 60 tablet 1    omeprazole (PRILOSEC) 20 MG delayed release capsule TAKE 1 CAPSULE DAILY 90 capsule 1    pimecrolimus (ELIDEL) 1 % cream Apply to skin folds qpm 60 g 5    ketoconazole (NIZORAL) 2 % cream Apply qam to rash in body folds 60 g 5    nebivolol (BYSTOLIC) 10 MG tablet TAKE 1 TABLET IN THE MORNING AND 2 TABLETS AT NIGHT 270 tablet 1    Misc.  Devices MISC Lateral  knee brace for the right knee 1 Device 0    cloNIDine (CATAPRES) 0.2 MG/24HR APPLY 1 PATCH WEEKLY 12 patch 2    hydrocortisone-pramoxine (ANALPRAM-HC) 2.5-1 % rectal cream Place rectally 3 times daily 30 g 0    polyethylene glycol (GLYCOLAX) packet DISSOLVE ONE PACKET IN 8OZ LIQUID & DRINK BY MOUTH DAILY 100 each 2    losartan-hydrochlorothiazide (HYZAAR) 100-25 MG per tablet Take 1 tablet by mouth nightly       aspirin 81 MG tablet Take 1 tablet by mouth daily. 30 tablet 0    ranitidine (ZANTAC) 300 MG tablet Take 1 tablet by mouth nightly for 7 days 7 tablet 0    gabapentin (NEURONTIN) 100 MG capsule Take 1 capsule by mouth 2 times daily for 30 days. Intended supply: 30 days 60 capsule 0     No current facility-administered medications for this visit. Allergies   Allergen Reactions    Codeine      Feels like will pass out    Other Other (See Comments)     Red dyes dizziness     Cortisone     Flu Virus Vaccine Nausea And Vomiting    Synthroid [Levothyroxine Sodium] Nausea And Vomiting       Health Maintenance   Topic Date Due    DTaP/Tdap/Td vaccine (1 - Tdap) 07/14/2020 (Originally 3/27/1964)    Shingles Vaccine (1 of 2) 07/06/2021 (Originally 3/27/1994)    A1C test (Diabetic or Prediabetic)  07/25/2019    Pneumococcal 65+ years Vaccine (2 of 2 - PPSV23) 10/02/2019    Potassium monitoring  11/08/2019    Creatinine monitoring  11/08/2019    Lipid screen  11/08/2023    Colon cancer screen colonoscopy  10/26/2026    DEXA (modify frequency per FRAX score)  Addressed       Subjective:      Review of Systems   Constitutional: Negative for activity change, appetite change, chills, diaphoresis, fatigue, fever and unexpected weight change. HENT: Negative for rhinorrhea and sore throat. Respiratory: Negative for cough and shortness of breath. Cardiovascular: Negative for chest pain. Gastrointestinal: Negative for anorexia, diarrhea, nausea and vomiting. Genitourinary: Negative for difficulty urinating. Musculoskeletal: Negative for arthralgias and joint pain. Skin: Positive for rash (no visible rash, just itching on both UE). Negative for color change, nail changes and pallor. Allergic/Immunologic: Negative for immunocompromised state.    Neurological: Negative for dizziness, weakness, given educational materials - see patient instructions. Discussed use,benefit, and side effects of prescribed medications. All patient questions answered. Pt voiced understanding. Reviewed health maintenance. Instructed to continue currentmedications, diet and exercise.     Electronically signed by Alden Mckinnon CNP on 4/8/2019 at 2:15 PM

## 2019-04-09 ENCOUNTER — TELEPHONE (OUTPATIENT)
Dept: DERMATOLOGY | Age: 74
End: 2019-04-09

## 2019-04-09 NOTE — TELEPHONE ENCOUNTER
Based on last note next step would be phototherapy - so I am happy to place the referral (she can choose [facility] or [facility])- we don't offer that here.     We had discussed options nbUVB versus immunosuppression versus dupixent at her last appt and she did not like the SE profile of the latter 2 so next step would be nbUVB - if she wants to be seen sooner here can move up appt with Dr. Wilfrido Cruz, but as options have not changed I would probably recommend trial of nbUVB first,    Sean Carrasquillo

## 2019-04-16 ENCOUNTER — OFFICE VISIT (OUTPATIENT)
Dept: DERMATOLOGY | Age: 74
End: 2019-04-16
Payer: MEDICARE

## 2019-04-16 ENCOUNTER — HOSPITAL ENCOUNTER (EMERGENCY)
Age: 74
Discharge: HOME | End: 2019-04-16
Attending: EMERGENCY MEDICINE
Payer: MEDICARE

## 2019-04-16 VITALS
OXYGEN SATURATION: 95 % | BODY MASS INDEX: 33.3 KG/M2 | WEIGHT: 212.2 LBS | HEIGHT: 67 IN | SYSTOLIC BLOOD PRESSURE: 150 MMHG | HEART RATE: 57 BPM | DIASTOLIC BLOOD PRESSURE: 82 MMHG

## 2019-04-16 VITALS
SYSTOLIC BLOOD PRESSURE: 170 MMHG | OXYGEN SATURATION: 99 % | WEIGHT: 212 LBS | BODY MASS INDEX: 33.27 KG/M2 | TEMPERATURE: 98.3 F | HEART RATE: 45 BPM | HEIGHT: 67 IN | RESPIRATION RATE: 16 BRPM | DIASTOLIC BLOOD PRESSURE: 99 MMHG

## 2019-04-16 DIAGNOSIS — Z86.19 HISTORY OF SHINGLES: Primary | ICD-10-CM

## 2019-04-16 DIAGNOSIS — L30.4 INTERTRIGO: ICD-10-CM

## 2019-04-16 DIAGNOSIS — I10 HYPERTENSION, UNSPECIFIED TYPE: Primary | ICD-10-CM

## 2019-04-16 DIAGNOSIS — L28.0 LICHEN SIMPLEX CHRONICUS: ICD-10-CM

## 2019-04-16 LAB
ABSOLUTE EOS #: 0.2 K/UL (ref 0–0.4)
ABSOLUTE IMMATURE GRANULOCYTE: NORMAL K/UL (ref 0–0.3)
ABSOLUTE LYMPH #: 1.9 K/UL (ref 1–4.8)
ABSOLUTE MONO #: 0.5 K/UL (ref 0.2–0.8)
ANION GAP SERPL CALCULATED.3IONS-SCNC: 13 MMOL/L (ref 9–17)
BASOPHILS # BLD: 1 % (ref 0–2)
BASOPHILS ABSOLUTE: 0.1 K/UL (ref 0–0.2)
BNP INTERPRETATION: ABNORMAL
BUN BLDV-MCNC: 12 MG/DL (ref 8–23)
BUN/CREAT BLD: 19 (ref 9–20)
CALCIUM SERPL-MCNC: 9.3 MG/DL (ref 8.6–10.4)
CHLORIDE BLD-SCNC: 98 MMOL/L (ref 98–107)
CO2: 25 MMOL/L (ref 20–31)
CREAT SERPL-MCNC: 0.64 MG/DL (ref 0.5–0.9)
DIFFERENTIAL TYPE: NORMAL
EOSINOPHILS RELATIVE PERCENT: 3 % (ref 1–4)
GFR AFRICAN AMERICAN: >60 ML/MIN
GFR NON-AFRICAN AMERICAN: >60 ML/MIN
GFR SERPL CREATININE-BSD FRML MDRD: ABNORMAL ML/MIN/{1.73_M2}
GFR SERPL CREATININE-BSD FRML MDRD: ABNORMAL ML/MIN/{1.73_M2}
GLUCOSE BLD-MCNC: 100 MG/DL (ref 70–99)
HCT VFR BLD CALC: 36.1 % (ref 36–46)
HEMOGLOBIN: 12.2 G/DL (ref 12–16)
IMMATURE GRANULOCYTES: NORMAL %
LYMPHOCYTES # BLD: 25 % (ref 24–44)
MCH RBC QN AUTO: 30.1 PG (ref 26–34)
MCHC RBC AUTO-ENTMCNC: 33.8 G/DL (ref 31–37)
MCV RBC AUTO: 89.2 FL (ref 80–100)
MONOCYTES # BLD: 7 % (ref 1–7)
MYOGLOBIN: 49 NG/ML (ref 25–58)
NRBC AUTOMATED: NORMAL PER 100 WBC
PDW BLD-RTO: 13.5 % (ref 11.5–14.5)
PLATELET # BLD: 274 K/UL (ref 130–400)
PLATELET ESTIMATE: NORMAL
PMV BLD AUTO: NORMAL FL (ref 6–12)
POTASSIUM SERPL-SCNC: 2.9 MMOL/L (ref 3.7–5.3)
PRO-BNP: 392 PG/ML
RBC # BLD: 4.05 M/UL (ref 4–5.2)
RBC # BLD: NORMAL 10*6/UL
SEG NEUTROPHILS: 64 % (ref 36–66)
SEGMENTED NEUTROPHILS ABSOLUTE COUNT: 4.9 K/UL (ref 1.8–7.7)
SODIUM BLD-SCNC: 136 MMOL/L (ref 135–144)
TROPONIN INTERP: NORMAL
TROPONIN T: NORMAL NG/ML
TROPONIN, HIGH SENSITIVITY: 7 NG/L (ref 0–14)
WBC # BLD: 7.6 K/UL (ref 3.5–11)
WBC # BLD: NORMAL 10*3/UL

## 2019-04-16 PROCEDURE — 3017F COLORECTAL CA SCREEN DOC REV: CPT | Performed by: DERMATOLOGY

## 2019-04-16 PROCEDURE — 6370000000 HC RX 637 (ALT 250 FOR IP): Performed by: EMERGENCY MEDICINE

## 2019-04-16 PROCEDURE — 4040F PNEUMOC VAC/ADMIN/RCVD: CPT | Performed by: DERMATOLOGY

## 2019-04-16 PROCEDURE — 1090F PRES/ABSN URINE INCON ASSESS: CPT | Performed by: DERMATOLOGY

## 2019-04-16 PROCEDURE — G8427 DOCREV CUR MEDS BY ELIG CLIN: HCPCS | Performed by: DERMATOLOGY

## 2019-04-16 PROCEDURE — G8417 CALC BMI ABV UP PARAM F/U: HCPCS | Performed by: DERMATOLOGY

## 2019-04-16 PROCEDURE — G8399 PT W/DXA RESULTS DOCUMENT: HCPCS | Performed by: DERMATOLOGY

## 2019-04-16 PROCEDURE — 84484 ASSAY OF TROPONIN QUANT: CPT

## 2019-04-16 PROCEDURE — 1123F ACP DISCUSS/DSCN MKR DOCD: CPT | Performed by: DERMATOLOGY

## 2019-04-16 PROCEDURE — 80048 BASIC METABOLIC PNL TOTAL CA: CPT

## 2019-04-16 PROCEDURE — 99282 EMERGENCY DEPT VISIT SF MDM: CPT

## 2019-04-16 PROCEDURE — 83880 ASSAY OF NATRIURETIC PEPTIDE: CPT

## 2019-04-16 PROCEDURE — 1036F TOBACCO NON-USER: CPT | Performed by: DERMATOLOGY

## 2019-04-16 PROCEDURE — 99213 OFFICE O/P EST LOW 20 MIN: CPT | Performed by: DERMATOLOGY

## 2019-04-16 PROCEDURE — 83874 ASSAY OF MYOGLOBIN: CPT

## 2019-04-16 PROCEDURE — 85025 COMPLETE CBC W/AUTO DIFF WBC: CPT

## 2019-04-16 PROCEDURE — 93005 ELECTROCARDIOGRAM TRACING: CPT

## 2019-04-16 RX ORDER — PIMECROLIMUS 10 MG/G
CREAM TOPICAL EVERY EVENING
COMMUNITY
End: 2019-06-26

## 2019-04-16 RX ORDER — CLONIDINE 0.3 MG/24H
1 PATCH, EXTENDED RELEASE TRANSDERMAL WEEKLY
Qty: 4 PATCH | Refills: 0 | Status: SHIPPED | OUTPATIENT
Start: 2019-04-16

## 2019-04-16 RX ORDER — KETOCONAZOLE 20 MG/G
CREAM TOPICAL
Qty: 60 G | Refills: 5 | Status: SHIPPED | OUTPATIENT
Start: 2019-04-16 | End: 2019-04-16

## 2019-04-16 RX ORDER — KETOCONAZOLE 20 MG/G
CREAM TOPICAL EVERY MORNING
COMMUNITY
End: 2019-06-26 | Stop reason: ALTCHOICE

## 2019-04-16 RX ORDER — COCONUT OIL
OIL (ML) MISCELLANEOUS
COMMUNITY

## 2019-04-16 RX ORDER — POTASSIUM CHLORIDE 750 MG/1
10 TABLET, EXTENDED RELEASE ORAL DAILY
Qty: 30 TABLET | Refills: 0 | Status: SHIPPED | OUTPATIENT
Start: 2019-04-16 | End: 2019-06-26 | Stop reason: ALTCHOICE

## 2019-04-16 RX ORDER — NEBIVOLOL 10 MG/1
10 TABLET ORAL EVERY EVENING
COMMUNITY
End: 2019-11-26 | Stop reason: SDUPTHER

## 2019-04-16 RX ORDER — PIMECROLIMUS 10 MG/G
CREAM TOPICAL
Qty: 60 G | Refills: 5 | Status: SHIPPED | OUTPATIENT
Start: 2019-04-16 | End: 2019-04-16

## 2019-04-16 RX ORDER — POTASSIUM CHLORIDE 20 MEQ/1
40 TABLET, EXTENDED RELEASE ORAL ONCE
Status: COMPLETED | OUTPATIENT
Start: 2019-04-16 | End: 2019-04-16

## 2019-04-16 RX ORDER — OMEPRAZOLE 40 MG/1
40 CAPSULE, DELAYED RELEASE ORAL DAILY PRN
COMMUNITY
End: 2019-08-01 | Stop reason: SDUPTHER

## 2019-04-16 RX ADMIN — POTASSIUM CHLORIDE 40 MEQ: 20 TABLET, EXTENDED RELEASE ORAL at 17:24

## 2019-04-16 NOTE — ED NOTES
Lab calls critical lab value. Potassium- 2.9  Dr Vamsi Barber informed.      Liv Owusu, ANGEL  04/16/19 [id number]

## 2019-04-16 NOTE — ED PROVIDER NOTES
[address] [place] [address] ED  eMERGENCY dEPARTMENT eNCOUnter      Pt Name: Kaleta Sandifer  MRN: [id number]  [place] 1945  Date of evaluation: 4/16/2019  Provider: Lexii Stanley MD    CHIEF COMPLAINT     Chief Complaint   Patient presents with    Hypertension         HISTORY OF PRESENT ILLNESS   (Location/Symptom, Timing/Onset, Context/Setting,Quality, Duration, Modifying Factors, Severity)  Note limiting factors. Kaleta Sandifer is a 75 y.o. female who presents to the emergency department for evaluation of asymptomatic hypertension. She was at her dermatologist's office this morning getting her shingles rash checked out and was found to have an incidentally elevated blood pressure for which she was sent to the emergency department. Patient takes Bystolic 10 mg and Hyzaar 100/25 mg at night. She uses a #2 Catapres patch weekly. She denies headache, chest pain, tightness or shortness of breath. The history is provided by the patient and medical records. Nursing Notes werereviewed. REVIEW OF SYSTEMS    (2-9 systems for level 4, 10 or more for level 5)     Review of Systems   All other systems reviewed and are negative. Except as noted above the remainder of the review of systems was reviewed and negative.        PAST MEDICAL HISTORY     Past Medical History:   Diagnosis Date    Atrial flutter ([address]     with palpitations    Chronic constipation     whole life    Eczema     Frequent sinus infections     GERD (gastroesophageal reflux disease)     resolved    Hemorrhoid 10/26/2016    internal and external    Hemorrhoids 8/22/2017    Hypertension     Pain     Palpitations     Tooth wear     FALSE TOOTH LOWER BOTTOM REMOVABLE PERMANENT DENTAL WORK    Wears glasses          SURGICALHISTORY       Past Surgical History:   Procedure Laterality Date    ANUS SURGERY      fistulotomy    APPENDECTOMY  1960    BREAST BIOPSY Left 1988    COLONOSCOPY  10/26/2016    adenomatous polyps(tubular adenomas) History   Problem Relation Age of Onset    Cancer Sister         pancrease    Heart Disease Mother     Heart Disease Father     Diabetes Brother     No Known Problems Maternal Grandmother     No Known Problems Maternal Grandfather     No Known Problems Paternal Grandmother     No Known Problems Paternal Grandfather           SOCIAL HISTORY       Social History     Socioeconomic History    Marital status:      Spouse name: None    Number of children: None    Years of education: None    Highest education level: None   Occupational History    None   Social Needs    Financial resource strain: None    Food insecurity:     Worry: None     Inability: None    Transportation needs:     Medical: None     Non-medical: None   Tobacco Use    Smoking status: Former Smoker     Packs/day: 0.25     Years: 5.00     Pack years: 1.25     Types: Cigarettes     Last attempt to quit: 1977     Years since quittin.7    Smokeless tobacco: Never Used    Tobacco comment: QUIT SMOKING YRS AGO 's   Substance and Sexual Activity    Alcohol use:  Yes     Alcohol/week: 0.6 oz     Types: 1 Glasses of wine per week     Comment: OCC WINE    Drug use: No    Sexual activity: Never     Partners: Male     Comment:  passed away 21yrs agp   Lifestyle    Physical activity:     Days per week: None     Minutes per session: None    Stress: None   Relationships    Social connections:     Talks on phone: None     Gets together: None     Attends Nondenominational service: None     Active member of club or organization: None     Attends meetings of clubs or organizations: None     Relationship status: None    Intimate partner violence:     Fear of current or ex partner: None     Emotionally abused: None     Physically abused: None     Forced sexual activity: None   Other Topics Concern    None   Social History Narrative    None       SCREENINGS             PHYSICAL EXAM    (up to 7 for level 4, 8 or more for level 5) ED Triage Vitals   BP Temp Temp Source Pulse Resp SpO2 Height Weight   04/16/19 1523 04/16/19 1523 04/16/19 1523 04/16/19 1523 04/16/19 1559 04/16/19 1523 04/16/19 1523 04/16/19 1523   (!) 204/77 98.3 °F (36.8 °C) Oral 58 15 100 % 5' 7\" (1.702 m) 212 lb (96.2 kg)       Physical Exam   Constitutional: She is oriented to person, place, and time. She appears well-developed and well-nourished. No distress. HENT:   Head: Normocephalic. Right Ear: External ear normal.   Left Ear: External ear normal.   Nose: Nose normal.   Mouth/Throat: Oropharynx is clear and moist.   Eyes: Pupils are equal, round, and reactive to light. EOM are normal. No scleral icterus. Neck: Neck supple. Cardiovascular: Normal rate, regular rhythm and normal heart sounds. No murmur heard. Pulmonary/Chest: Effort normal and breath sounds normal. She has no rales. Abdominal: Soft. There is no tenderness. Musculoskeletal: Normal range of motion. Neurological: She is alert and oriented to person, place, and time. No cranial nerve deficit. She exhibits normal muscle tone. Coordination normal.   Skin: Skin is warm and dry. No pallor. Psychiatric: She has a normal mood and affect. Nursing note and vitals reviewed. DIAGNOSTIC RESULTS     EKG: All EKG's are interpreted by the Emergency Department Physician who either signs orCo-signs this chart in the absence of a cardiologist.    Sinus bradycardia with a heart rate of 47 beats a minute. No acute ischemic change.     RADIOLOGY:   Non-plain film images such as CT, Ultrasound and MRI are read by the radiologist. Plain radiographic images are visualized and preliminarily interpreted by the emergency physician with the below findings:    Interpretation per the Radiologist below, ifavailable at the time of this note:    No orders to display         ED BEDSIDE ULTRASOUND:   Performed by ED Physician - none    LABS:  Labs Reviewed   BASIC METABOLIC PANEL - Abnormal; Notable for the following components:       Result Value    Glucose 100 (*)     Potassium 2.9 (*)     All other components within normal limits   BRAIN NATRIURETIC PEPTIDE - Abnormal; Notable for the following components:    Pro- (*)     All other components within normal limits   CBC WITH AUTO DIFFERENTIAL   TROP/MYOGLOBIN       All other labs were within normal range ornot returned as of this dictation. EMERGENCY DEPARTMENT COURSE and DIFFERENTIAL DIAGNOSIS/MDM:   Vitals:    Vitals:    04/16/19 1710 04/16/19 1731 04/16/19 1746 04/16/19 1801   BP: (!) 157/63 (!) 180/72 (!) 167/73 (!) 170/99   Pulse: (!) 43 (!) 43 59 (!) 45   Resp: 18 12 22 16   Temp:       TempSrc:       SpO2: 99% 100% 99% 99%   Weight:       Height:           Serum potassium was noted to 0.9 and is replenished with 40 mEq of K-dur. Patient's workup is otherwise unremarkable. Upon discharge she is placed on oral potassium supplementation and is advised to split her blood pressure medication such that she takes Hyzaar in the morning and Bystolic at night. Catapres patch is increased to #3. MDM    CONSULTS:  None    PROCEDURES:  Unlessotherwise noted below, none     Procedures    FINAL IMPRESSION      1.  Hypertension, unspecified type          DISPOSITION/PLAN   DISPOSITION Decision To Discharge 04/16/2019 05:41:22 PM      PATIENT REFERRED TO:  Zahira Coleman, JULIA - CNP  [address] [address]  [phone]            DISCHARGE MEDICATIONS:         Problem List:  Patient Active Problem List   Diagnosis Code    Spondylolisthesis M43.10    Spinal stenosis, lumbar region, with neurogenic claudication M48.062    HTN (hypertension) I10    GERD (gastroesophageal reflux disease) K21.9    Arm heaviness R29.898    Bradycardia R00.1    Atypical chest pain R07.89    Rectal pain K62.89    Slow transit constipation K59.01    Hemorrhoids K64.9  Herpes zoster with complication S18.4           Summation      Patient Course:  Discharged. ED Medicationsadministered this visit:    Medications   potassium chloride (KLOR-CON M) extended release tablet 40 mEq (40 mEq Oral Given 4/16/19 7035)       New Prescriptions from this visit:    Discharge Medication List as of 4/16/2019  5:44 PM      START taking these medications    Details   cloNIDine (CATAPRES-TTS-3) 0.3 MG/24HR PTWK Place 1 patch onto the skin once a week, Disp-4 patch, R-0Print      potassium chloride (KLOR-CON M) 10 MEQ extended release tablet Take 1 tablet by mouth daily, Disp-30 tablet, R-0Print             Follow-up:  Bonnie Lagunas, APRN - CNP  [address],  Conor Saunders [address] [place] [address]  [phone]              Final Impression:   1.  Hypertension, unspecified type               (Please note that portions of this note were completed with a voice recognitionprogram.  Efforts were made to edit the dictations but occasionally words are mis-transcribed.)    Kvaitha Chadwick MD (electronically signed)  Attending Emergency Physician            Kavitha Chadwick MD  04/16/19 6148

## 2019-04-16 NOTE — PROGRESS NOTES
Dermatology Patient Note  [facility] DERMATOLOGY  [address]  [address] [place] [address]  Dept: [phone]  Dept Fax: [phone]      VISITDATE: 4/16/2019   REFERRING PROVIDER: No ref. provider found      Whitley Porter is a 75 y.o. female  who presents today in the office for:    Follow-up (3 month follow up on eczema. Pt not sure that she ever had eczema becuase she was dx with shingles. She blistered up on the rt side of her body. The shingles have resolved but had some pain and itching that stuck around for awhile. Treated with valtrex, gabapentin and hydroxyzine. )      HISTORY OF PRESENT ILLNESS:  Patients presents for f/u eczema  Interval history: patient was initially seen by me and treated for intertrigo with ketoconazole and elidel. She was then seen by Dr. Manish Martell at which time she had an eczematous rash spread up her right chest and neck. This has since resolved, but she did develop shingles of her right neck and arm in early March. Today she is concerned about the itchy shingles scars and persistent submammary rash  Patient assessment of progress: partial and inadequate  Current treatment and adherence: elidel and ketoconazole. Given hydroxyzine for post-shingles pruritus  Prior treatments tried: see initial HPI      CURRENT MEDICATIONS:   Current Outpatient Medications   Medication Sig Dispense Refill    pimecrolimus (ELIDEL) 1 % cream Apply to skin folds qpm 60 g 5    ketoconazole (NIZORAL) 2 % cream Apply qam to rash in body folds 60 g 5    hydrOXYzine (VISTARIL) 25 MG capsule Take 1 capsule by mouth nightly as needed for Itching 30 capsule 0    valACYclovir (VALTREX) 1 g tablet Take 1,000 mg by mouth 2 times daily      lidocaine (LIDODERM) 5 % Place 1 patch onto the skin daily 12 hours on, 12 hours off.  30 patch 0    ranitidine (ZANTAC) 300 MG tablet Take 1 tablet by mouth nightly for 7 days 7 tablet 0    ibuprofen (ADVIL;MOTRIN) 800 MG tablet Take 1 tablet by mouth 2 times daily as needed for Pain 60 tablet 1    gabapentin (NEURONTIN) 100 MG capsule Take 1 capsule by mouth 2 times daily for 30 days. Intended supply: 30 days 60 capsule 0    omeprazole (PRILOSEC) 20 MG delayed release capsule TAKE 1 CAPSULE DAILY 90 capsule 1    nebivolol (BYSTOLIC) 10 MG tablet TAKE 1 TABLET IN THE MORNING AND 2 TABLETS AT NIGHT 270 tablet 1    Misc. Devices MISC Lateral  knee brace for the right knee 1 Device 0    cloNIDine (CATAPRES) 0.2 MG/24HR APPLY 1 PATCH WEEKLY 12 patch 2    hydrocortisone-pramoxine (ANALPRAM-HC) 2.5-1 % rectal cream Place rectally 3 times daily 30 g 0    polyethylene glycol (GLYCOLAX) packet DISSOLVE ONE PACKET IN 8OZ LIQUID & DRINK BY MOUTH DAILY 100 each 2    losartan-hydrochlorothiazide (HYZAAR) 100-25 MG per tablet Take 1 tablet by mouth nightly       aspirin 81 MG tablet Take 1 tablet by mouth daily. 30 tablet 0     No current facility-administered medications for this visit. ALLERGIES:   Allergies   Allergen Reactions    Codeine      Feels like will pass out    Other Other (See Comments)     Red dyes dizziness     Cortisone     Flu Virus Vaccine Nausea And Vomiting    Synthroid [Levothyroxine Sodium] Nausea And Vomiting       SOCIAL HISTORY:  Social History     Tobacco Use    Smoking status: Former Smoker     Packs/day: 0.25     Years: 5.00     Pack years: 1.25     Types: Cigarettes     Last attempt to quit: 1977     Years since quittin.7    Smokeless tobacco: Never Used    Tobacco comment: QUIT SMOKING YRS AGO 's   Substance Use Topics    Alcohol use: Yes     Alcohol/week: 0.6 oz     Types: 1 Glasses of wine per week     Comment: OCC WINE       REVIEW OF SYSTEMS:  Review of Systems  Skin: Denies any new changing, growing orbleeding lesions or rashes except as described in the HPI   Constitutional: Denies fevers, chills, and malaise.     PHYSICAL EXAM:   BP (!) 150/82 (Site: Left Upper Arm, Position: Sitting, Cuff Size: Large Adult)   Pulse 57   Ht 5' 7\" (1.702 m)   Wt 212 lb 3.2 oz (96.3 kg)   SpO2 95%   BMI 33.24 kg/m²     General Exam:  General Appearance: No acute distress, Well nourished     Neuro: Alert and oriented to person, place and time  Psych: Normal affect   Lymph Node: Not performed    Cutaneous Exam: Performed as documented in clinic note below. Waist-up skin, which includes the head/face,neck, both arms, chest, back, abdomen, digits and/or nails, was examined. Pertinent Physical Exam Findings:  Physical Exam  Right submammary fold with hyperpigmented patch with few inlying papules  Right shoulder, arm, and hand with grouped hyperpigmented to pink papules in dermatomal distribution    Photo surveillance performed: No    Medical Necessity of Exam Performed:   Distribution of patient concerns    Additional Diagnostic Testing performed during exam: Not performed ,  Not performed    ASSESSMENT:   Diagnosis Orders   1. History of shingles     2. Intertrigo  pimecrolimus (ELIDEL) 1 % cream    ketoconazole (NIZORAL) 2 % cream   3. Lichen simplex chronicus  pimecrolimus (ELIDEL) 1 % cream       Plan of Action is as Follows:  Assessment   1. Intertrigo of breast  - explained to patient that this is unrelated to the shingles rash  - the eczematous rash from last visit has resolved, will not pursue nbUVB  - continue elidel and ketoconazole (patient says it's helpful)  - pimecrolimus (ELIDEL) 1 % cream; Apply to skin folds qpm  Dispense: 60 g; Refill: 5  - ketoconazole (NIZORAL) 2 % cream; Apply qam to rash in body folds  Dispense: 60 g; Refill: 5    2. Post-herpetic pruritus  - ok to take hydroxyzine as rx'd by PCP. Can also use elidel  - pimecrolimus (ELIDEL) 1 % cream; Apply to skin folds qpm  Dispense: 60 g; Refill: 5  - would recommend shingles vaccine one year after outbreak    Patient does not tolerate even low potency TCS. Reports dizziness and heart palpitations.     RTC 6 months            Patient Instructions   1. Continue pimecrolimus (ELIDEL) 1 % cream; Apply to skin folds in the evening  2. Continue ketoconazole (NIZORAL) 2 % cream; Apply to skin folds in the morning  3. Apply Eucerin to all of skin twice daily         Sun Protection     There are two types of sun rays that are harmful to the skin. UVA rays cause skin aging and skin cancer, such as melanoma. UVB rays cause sunburns, cataracts, and also contribute to skin cancer. The American-Academy of Dermat[employer] recommends that children and adults wear a broad spectrum, waterproof sunscreen with a Sun Protection Factor (SPF) of 30 or higher. It is important to check the ingredient label to be sure the sunscreen will protect the skin from both UVA and UVB sunrays. Your sunscreen should contain at least one of the following ingredients: titanium dioxide, zinc oxide, or avobenzone. Sunscreen will not be effective unless it is applied to all exposed skin. Sunscreens work best if they are applied 30 minutes before sun exposure. They should be reapplied every 2 hours and after any water exposure. Sunscreen is not perfect. It is important to use other methods to protect the skin from sun exposure also. Wear hats, sunglasses and other sun protective clothing when outdoors. Stay in the shade during the peak hours of sun exposure between 10 AM and 4 PM.      Follow-up: No follow-ups on file. This note was created with the assistance of a speech-recognition program.  Although the intention is to generate a document that actually reflects the content of the visit, no guarantees can be provided that every mistake has been identified and corrected byediting.     Electronically signed by Karina Jean MD on 4/16/19 at 12:19 PM

## 2019-04-16 NOTE — PROGRESS NOTES
Emergency Department Pharmacist Note    Patient presents to the ED with complaints of:   Chief Complaint   Patient presents with    Hypertension     Patient was seen by the ED pharmacist and offered counseling. Counseled patient on potassium and increased dose of patients clonidine patches including administration, its mechanism of action, duration of therapy, and adverse effects. All medication related questions were answered and patient verbalized understanding.     Berny Azevedo PharmD  4/16/2019  6:05 PM
topically daily       Coconut Oil OIL Apply topically to scars                       hydrOXYzine (VISTARIL) 25 MG capsule Take 1 capsule by mouth nightly as needed for Itching       lidocaine (LIDODERM) 5 % Place 1 patch onto the skin daily 12 hours on, 12 hours off. ibuprofen (ADVIL;MOTRIN) 800 MG tablet Take 1 tablet by mouth 2 times daily as needed for Pain                                               Misc. Devices MISC Lateral  knee brace for the right knee                       polyethylene glycol (GLYCOLAX) packet DISSOLVE ONE PACKET IN 8OZ LIQUID & DRINK BY MOUTH DAILY       losartan-hydrochlorothiazide (HYZAAR) 100-25 MG per tablet Take 1 tablet by mouth nightly        aspirin 81 MG tablet Take 1 tablet by mouth daily.

## 2019-04-16 NOTE — ED NOTES
Pt ambulatory to room. Pt c/o high BP at doctors office today. Pt states she was at dermatologist office today for check up and her BP reading was high. Pt states she has been taking her BP meds regularly. Pt denies CP, denes SOB, denies headache, denies any symptoms. Pt A&O x3, skin warm and dry, respirations equal and unlabored bilat, heart sounds regular and slow.      Yohana Rivera RN  04/16/19 [id number]

## 2019-04-19 LAB
EKG ATRIAL RATE: 47 BPM
EKG P AXIS: 50 DEGREES
EKG P-R INTERVAL: 164 MS
EKG Q-T INTERVAL: 494 MS
EKG QRS DURATION: 94 MS
EKG QTC CALCULATION (BAZETT): 437 MS
EKG R AXIS: -11 DEGREES
EKG T AXIS: 6 DEGREES
EKG VENTRICULAR RATE: 47 BPM

## 2019-06-04 ENCOUNTER — HOSPITAL ENCOUNTER (OUTPATIENT)
Age: 74
Discharge: HOME | End: 2019-06-04
Payer: MEDICARE

## 2019-06-04 LAB
ANION GAP SERPL CALCULATED.3IONS-SCNC: 12 MMOL/L (ref 9–17)
BUN BLDV-MCNC: 13 MG/DL (ref 8–23)
BUN/CREAT BLD: ABNORMAL (ref 9–20)
CALCIUM SERPL-MCNC: 9.5 MG/DL (ref 8.6–10.4)
CHLORIDE BLD-SCNC: 102 MMOL/L (ref 98–107)
CO2: 28 MMOL/L (ref 20–31)
CREAT SERPL-MCNC: 0.69 MG/DL (ref 0.5–0.9)
GFR AFRICAN AMERICAN: >60 ML/MIN
GFR NON-AFRICAN AMERICAN: >60 ML/MIN
GFR SERPL CREATININE-BSD FRML MDRD: ABNORMAL ML/MIN/{1.73_M2}
GFR SERPL CREATININE-BSD FRML MDRD: ABNORMAL ML/MIN/{1.73_M2}
GLUCOSE BLD-MCNC: 129 MG/DL (ref 70–99)
POTASSIUM SERPL-SCNC: 3.9 MMOL/L (ref 3.7–5.3)
SODIUM BLD-SCNC: 142 MMOL/L (ref 135–144)

## 2019-06-04 PROCEDURE — 80048 BASIC METABOLIC PNL TOTAL CA: CPT

## 2019-06-04 PROCEDURE — 36415 COLL VENOUS BLD VENIPUNCTURE: CPT

## 2019-06-26 ENCOUNTER — OFFICE VISIT (OUTPATIENT)
Dept: DERMATOLOGY | Age: 74
End: 2019-06-26
Payer: MEDICARE

## 2019-06-26 ENCOUNTER — HOSPITAL ENCOUNTER (OUTPATIENT)
Age: 74
Setting detail: SPECIMEN
Discharge: HOME | End: 2019-06-26
Payer: MEDICARE

## 2019-06-26 VITALS
HEART RATE: 52 BPM | HEIGHT: 67 IN | SYSTOLIC BLOOD PRESSURE: 134 MMHG | DIASTOLIC BLOOD PRESSURE: 83 MMHG | BODY MASS INDEX: 33.21 KG/M2 | WEIGHT: 211.6 LBS | OXYGEN SATURATION: 95 %

## 2019-06-26 DIAGNOSIS — L29.9 PRURITIC DERMATITIS: Primary | ICD-10-CM

## 2019-06-26 DIAGNOSIS — B02.29 POST HERPETIC NEURALGIA: ICD-10-CM

## 2019-06-26 PROCEDURE — 3017F COLORECTAL CA SCREEN DOC REV: CPT | Performed by: DERMATOLOGY

## 2019-06-26 PROCEDURE — 99213 OFFICE O/P EST LOW 20 MIN: CPT | Performed by: DERMATOLOGY

## 2019-06-26 PROCEDURE — 11104 PUNCH BX SKIN SINGLE LESION: CPT | Performed by: DERMATOLOGY

## 2019-06-26 PROCEDURE — G8427 DOCREV CUR MEDS BY ELIG CLIN: HCPCS | Performed by: DERMATOLOGY

## 2019-06-26 PROCEDURE — 4040F PNEUMOC VAC/ADMIN/RCVD: CPT | Performed by: DERMATOLOGY

## 2019-06-26 PROCEDURE — G8417 CALC BMI ABV UP PARAM F/U: HCPCS | Performed by: DERMATOLOGY

## 2019-06-26 PROCEDURE — G8399 PT W/DXA RESULTS DOCUMENT: HCPCS | Performed by: DERMATOLOGY

## 2019-06-26 PROCEDURE — 1036F TOBACCO NON-USER: CPT | Performed by: DERMATOLOGY

## 2019-06-26 PROCEDURE — 1123F ACP DISCUSS/DSCN MKR DOCD: CPT | Performed by: DERMATOLOGY

## 2019-06-26 PROCEDURE — 1090F PRES/ABSN URINE INCON ASSESS: CPT | Performed by: DERMATOLOGY

## 2019-06-26 RX ORDER — LIDOCAINE HYDROCHLORIDE AND EPINEPHRINE 10; 10 MG/ML; UG/ML
0.5 INJECTION, SOLUTION INFILTRATION; PERINEURAL ONCE
Status: COMPLETED | OUTPATIENT
Start: 2019-06-26 | End: 2019-06-26

## 2019-06-26 RX ADMIN — LIDOCAINE HYDROCHLORIDE AND EPINEPHRINE 0.5 ML: 10; 10 INJECTION, SOLUTION INFILTRATION; PERINEURAL at 14:06

## 2019-06-26 NOTE — PROGRESS NOTES
Dermatology Patient Note  [facility] DERMATOLOGY  [address]  [address] [place] [address]  Dept: [phone]  Dept Fax: [phone]      VISITDATE: 6/26/2019   REFERRING PROVIDER: No ref. provider found      Whitley Porter is a 75 y.o. female  who presents today in the office for:    Follow-up (Patient is here for follow-up. She states she is still itching, but it has cleared up alot. She states it comes and goes. )      HISTORY OF PRESENT ILLNESS:  Patients presents for f/u pruritic rash  Interval history: improvement in itch overall, but still itchy under breast, and has developed a new itchy rash of her left neck. The area where she had shingles is still slightly pruritic  Patient assessment of progress: partial and inadequate  Current treatment and adherence: not using either the ketoconazole cream or elidel cream because one of them caused itch. She does not know which one. She previously had intolerance to even low potency topical steroid (reports lightheadedness and palpitations)  Prior treatments tried: see initial HPI      CURRENT MEDICATIONS:   Current Outpatient Medications   Medication Sig Dispense Refill    cloNIDine (CATAPRES-TTS-3) 0.3 MG/24HR PTWK Place 1 patch onto the skin once a week 4 patch 0    nebivolol (BYSTOLIC) 10 MG tablet Take 10 mg by mouth every evening      omeprazole (PRILOSEC) 40 MG delayed release capsule Take 40 mg by mouth daily as needed (heartburn)       Lactobacillus (MORE-DOPHILUS ACIDOPHILUS PO) Take 1 tablet by mouth daily      Emollient (EUCERIN EX) Apply topically daily      Coconut Oil OIL Apply topically to scars      Misc.  Devices MISC Lateral  knee brace for the right knee 1 Device 0    polyethylene glycol (GLYCOLAX) packet DISSOLVE ONE PACKET IN 8OZ LIQUID & DRINK BY MOUTH DAILY 100 each 2    losartan-hydrochlorothiazide (HYZAAR) 100-25 MG per tablet Take 1 tablet by mouth nightly       aspirin 81 MG tablet Take 1 tablet by surveillance performed: Yes    Medical Necessity of Exam Performed:   Distribution of patient concerns    Additional Diagnostic Testing performed during exam: Not performed ,  Not performed    ASSESSMENT:   Diagnosis Orders   1. Pruritic dermatitis  Surgical Pathology    TX PUNCH BIOPSY SKIN SINGLE LESION    lidocaine-EPINEPHrine 1 percent-1:058145 injection 0.5 mL   2. Post herpetic neuralgia         Plan of Action is as Follows:  Assessment   1. Intertrigo with LSC vs. Inverse LP -- new rash of neck is more c/w LP, biopsy today  Punch Biopsy: After cleaning with alcohol the rash was anesthetized with 1% lidocaine with epinephrine and a 4 mm punch was performed. Hemostasis was achieved with suture closure of the defect with 4-0 Prolene and Vaseline and a bandage were applied.  - Surgical Pathology; Future  - TX PUNCH BIOPSY SKIN SINGLE LESION  - lidocaine-EPINEPHrine 1 percent-1:688496 injection 0.5 mL    2. Post herpetic pruritus/PIH vs. Terrell's isotopic response (LP in prior shingles scar)    Patient does nto tolerate even low potency TCS. Does not know if elidel or ketoconazole made her itchy, will try each one individually. RTC for suture removal and MD visit            Patient Instructions   BIOPSY WOUND CARE    A biopsy is where a small piece of skin tissue is removed and examined by a pathologist.  When a biopsy is done, there is a small wound site that requires proper care to prevent infection and scarring. Some biopsies require sutures and their removal.    How to Care for Biopsy Wound    A.  Leave band-aid or dressing on for 24 hours. B. Wash two times a day with soap and water. C.  Let the wound air dry, then apply Vaseline ointment and cover with a Band-Aid       unless otherwise instructed by your provider. D. If there is slight discomfort, you may give acetaminophen or ibuprofen. When To Call the Doctor    Call the Dermatology Clinic or your doctor if any of the following occur:    A.

## 2019-06-26 NOTE — PATIENT INSTRUCTIONS

## 2019-06-27 ENCOUNTER — TELEPHONE (OUTPATIENT)
Dept: DERMATOLOGY | Age: 74
End: 2019-06-27

## 2019-06-28 ENCOUNTER — TELEPHONE (OUTPATIENT)
Dept: DERMATOLOGY | Age: 74
End: 2019-06-28

## 2019-06-28 LAB — DERMATOLOGY PATHOLOGY REPORT: NORMAL

## 2019-06-28 NOTE — TELEPHONE ENCOUNTER
Please inform patient that her biopsy shows eczema. Please use elidel cream twice daily to all areas she has a rash.  If this is ineffective, call me and we can try protopic

## 2019-07-10 ENCOUNTER — OFFICE VISIT (OUTPATIENT)
Dept: DERMATOLOGY | Age: 74
End: 2019-07-10
Payer: MEDICARE

## 2019-07-10 VITALS
BODY MASS INDEX: 31.86 KG/M2 | OXYGEN SATURATION: 95 % | HEART RATE: 51 BPM | SYSTOLIC BLOOD PRESSURE: 135 MMHG | DIASTOLIC BLOOD PRESSURE: 81 MMHG | WEIGHT: 203.4 LBS

## 2019-07-10 DIAGNOSIS — L30.8 OTHER ECZEMA: Primary | ICD-10-CM

## 2019-07-10 PROBLEM — H35.363 MACULAR DRUSEN, BILATERAL: Status: ACTIVE | Noted: 2018-08-02

## 2019-07-10 PROBLEM — E66.09 CLASS 1 OBESITY DUE TO EXCESS CALORIES WITHOUT SERIOUS COMORBIDITY WITH BODY MASS INDEX (BMI) OF 31.0 TO 31.9 IN ADULT: Status: ACTIVE | Noted: 2018-11-12

## 2019-07-10 PROBLEM — K60.2 ANAL FISSURE: Status: ACTIVE | Noted: 2017-09-12

## 2019-07-10 PROBLEM — H52.00 HYPEROPIA: Status: ACTIVE | Noted: 2017-01-11

## 2019-07-10 PROBLEM — H52.4 PRESBYOPIA: Status: ACTIVE | Noted: 2017-01-11

## 2019-07-10 PROBLEM — E66.811 CLASS 1 OBESITY DUE TO EXCESS CALORIES WITHOUT SERIOUS COMORBIDITY WITH BODY MASS INDEX (BMI) OF 31.0 TO 31.9 IN ADULT: Status: ACTIVE | Noted: 2018-11-12

## 2019-07-10 PROBLEM — H35.433: Status: ACTIVE | Noted: 2018-08-02

## 2019-07-10 PROBLEM — M25.511 ACUTE PAIN OF RIGHT SHOULDER: Status: ACTIVE | Noted: 2019-03-06

## 2019-07-10 PROBLEM — E78.2 MIXED HYPERLIPIDEMIA: Status: ACTIVE | Noted: 2018-11-12

## 2019-07-10 PROCEDURE — 1123F ACP DISCUSS/DSCN MKR DOCD: CPT | Performed by: DERMATOLOGY

## 2019-07-10 PROCEDURE — 3017F COLORECTAL CA SCREEN DOC REV: CPT | Performed by: DERMATOLOGY

## 2019-07-10 PROCEDURE — 1090F PRES/ABSN URINE INCON ASSESS: CPT | Performed by: DERMATOLOGY

## 2019-07-10 PROCEDURE — G8417 CALC BMI ABV UP PARAM F/U: HCPCS | Performed by: DERMATOLOGY

## 2019-07-10 PROCEDURE — 4040F PNEUMOC VAC/ADMIN/RCVD: CPT | Performed by: DERMATOLOGY

## 2019-07-10 PROCEDURE — G8427 DOCREV CUR MEDS BY ELIG CLIN: HCPCS | Performed by: DERMATOLOGY

## 2019-07-10 PROCEDURE — 99213 OFFICE O/P EST LOW 20 MIN: CPT | Performed by: DERMATOLOGY

## 2019-07-10 PROCEDURE — 1036F TOBACCO NON-USER: CPT | Performed by: DERMATOLOGY

## 2019-07-10 PROCEDURE — G8399 PT W/DXA RESULTS DOCUMENT: HCPCS | Performed by: DERMATOLOGY

## 2019-07-10 NOTE — PATIENT INSTRUCTIONS
1. Call when you are ready to schedule for patch testing  2. Continue to apply elidel twice daily to active rash/itch    Referral for cosmetics:  [facility]   [phone]  -[facility]   [phone]    Patch Testing for Skin Allergies   A patch test is a skin test used to find the cause of a possible allergic reaction on the skin. This reaction is called allergic contact dermatitis. Contact dermatitis is a reaction to something that came into contact with the skin. This kind of allergic reaction usually causes inflammation (redness, itching). Patch testing is done by the dermatology clinic. During patch testing, different substances are placed on the skin and taped in place. The patches are typically placed on the back, left on for 2 days and then removed. The area of skin that was tested will be evaluated by the dermatologist 2 to 4 days after the patches are removed. You will need to return to the dermatology clinic 2 to 3 times during the week that the patch testing is done. Generally the test week is a total of 7 days. It is important to follow the guidelines below to get accurate results. Patch testing evaluates a slow, delayed skin reaction so it is important that the patches stay in place. The test site must also be protected throughout the entire test week. Preparation for the Test   Getting the skin ready for your patch test is important. Following these instructions will help you to get more accurate, reliable results. - Avoid sun exposure for 1 to 2 weeks before patch testing.   - Do not use topical medicines (creams and ointments) on the back and any other area where patches may be placed for at least 1 week before patch testing. You can continue to use these medicines on areas of the body where patches will not be placed.    - You may use moisturizers on the skin until the day before patch testing.   - You may continue to take any prescribed antihistamines as usual

## 2019-07-10 NOTE — PROGRESS NOTES
Dermatology Patient Note  [facility] DERMATOLOGY  [address]  [address] [place] [address]  Dept: [phone]  Dept Fax: [phone]      VISITDATE: 7/10/2019   REFERRING PROVIDER: No ref. provider found      Joelle Ruiz is a 75 y.o. female  who presents today in the office for:    Other (Pt is here for her suture removal and bx results, she states that she is not better/worse. )      HISTORY OF PRESENT ILLNESS:  Patients presents for f/u itchy rash of neck, under breasts. SR and biopsy results  Biopsy showed subacute spongiotic dermatitis, no interface  Interval history: still itching, elidel helps. Stopped using ketoconazole cream as it was irritating  Patient assessment of progress: partial and inadequate  Current treatment and adherence: elidel cream twice daily  Prior treatments tried: see initial HPI      CURRENT MEDICATIONS:   Current Outpatient Medications   Medication Sig Dispense Refill    cloNIDine (CATAPRES-TTS-3) 0.3 MG/24HR PTWK Place 1 patch onto the skin once a week 4 patch 0    nebivolol (BYSTOLIC) 10 MG tablet Take 10 mg by mouth every evening      omeprazole (PRILOSEC) 40 MG delayed release capsule Take 40 mg by mouth daily as needed (heartburn)       Lactobacillus (MORE-DOPHILUS ACIDOPHILUS PO) Take 1 tablet by mouth daily      Emollient (EUCERIN EX) Apply topically daily      Coconut Oil OIL Apply topically to scars      Misc. Devices MISC Lateral  knee brace for the right knee 1 Device 0    polyethylene glycol (GLYCOLAX) packet DISSOLVE ONE PACKET IN 8OZ LIQUID & DRINK BY MOUTH DAILY 100 each 2    losartan-hydrochlorothiazide (HYZAAR) 100-25 MG per tablet Take 1 tablet by mouth nightly       aspirin 81 MG tablet Take 1 tablet by mouth daily. 30 tablet 0     No current facility-administered medications for this visit.         ALLERGIES:   Allergies   Allergen Reactions    Codeine      Feels like will pass out    Other Other (See Comments)     Red now involves left neck -- prior negative KOH, treated as intertrigo without improvement, but limited by patient's intolerance to even low potency topical steroids and sensitivity to ketoconazole cream. Now tolerating elidel but not adequately clear  - beginning to suspect a contact allergen driving this, but may also be undertreated eczema variant  - patient uses vanicream products, dove products, tries to avoid fragrances  - discussed metals as a possible trigger -- wears bra with underwire and wears jewelry. Will try avoidance. - also discussed patch testing -- will consider when it's cooler outside    RTC for patch testing            Patient Instructions   1. Call when you are ready to schedule for patch testing  2. Continue to apply elidel twice daily to active rash/itch    Referral for cosmetics:  [facility]   [phone]  -[facility]   [phone]    Patch Testing for Skin Allergies   A patch test is a skin test used to find the cause of a possible allergic reaction on the skin. This reaction is called allergic contact dermatitis. Contact dermatitis is a reaction to something that came into contact with the skin. This kind of allergic reaction usually causes inflammation (redness, itching). Patch testing is done by the dermatology clinic. During patch testing, different substances are placed on the skin and taped in place. The patches are typically placed on the back, left on for 2 days and then removed. The area of skin that was tested will be evaluated by the dermatologist 2 to 4 days after the patches are removed. You will need to return to the dermatology clinic 2 to 3 times during the week that the patch testing is done. Generally the test week is a total of 7 days. It is important to follow the guidelines below to get accurate results. Patch testing evaluates a slow, delayed skin reaction so it is important that the patches stay in place.  The test site must also be were put on. Typically you will come to the clinic to have the patches removed. The nurses will carefully remove the tape and patches. They will re-dion the skin where the patches were. The nurses will check the area to see if the skin shows any signs of a reaction. In many cases a reaction may not have shown up yet. It is still important to come back to the clinic to have the area checked one more time. Continue to keep the skin dry where the patches were. Do not take a shower or bathe where the patches were. Do not apply creams, ointments, or moisturizers where the patches were. Do not scratch the skin where the patches were. You should avoid hot areas and activities that cause excessive sweating. Removing patches: Normally you should not remove patches yourself. Usually the nurses in the dermatology clinic remove them. Occasionally the doctor will ask a family to remove the patches at home. If you have been given this instruction you will remove the tape and clear bandage carefully. You will re-dion the borders of the patch testing sheets with the marker you were given before you take them off the skin. Sometimes a patch may fall off or pull away so that it is not actually touching the skin. If this happens it is important to let your childs doctor know at your next appointment. The Third Visit:   The third visit is usually 7 days after the patches were put on. You will come to the clinic to have the skin checked. It is important to check the skin again to look for skin reactions. The nurses and doctor will look at the skin and tell you if there are any substances that you should avoid. After your visit you can restart topical creams, ointments, and moisturizers. You may shower and bathe normally. If you have any questions, be sure to ask your doctor or nurse. Follow-up: No follow-ups on file.       This note was created with the assistance of a speech-recognition program.  Although the

## 2019-07-29 ENCOUNTER — TELEPHONE (OUTPATIENT)
Dept: FAMILY MEDICINE CLINIC | Age: 74
End: 2019-07-29

## 2019-08-01 RX ORDER — HYDROXYZINE HYDROCHLORIDE 25 MG/1
25 TABLET, FILM COATED ORAL NIGHTLY
Qty: 30 TABLET | Refills: 3 | Status: SHIPPED | OUTPATIENT
Start: 2019-08-01 | End: 2019-08-31

## 2019-08-01 RX ORDER — OMEPRAZOLE 20 MG/1
CAPSULE, DELAYED RELEASE ORAL
Qty: 90 CAPSULE | Refills: 4 | Status: SHIPPED | OUTPATIENT
Start: 2019-08-01 | End: 2020-01-23 | Stop reason: SDUPTHER

## 2019-09-24 ENCOUNTER — OFFICE VISIT (OUTPATIENT)
Dept: FAMILY MEDICINE CLINIC | Age: 74
End: 2019-09-24
Payer: MEDICARE

## 2019-09-24 VITALS
DIASTOLIC BLOOD PRESSURE: 76 MMHG | HEART RATE: 64 BPM | SYSTOLIC BLOOD PRESSURE: 118 MMHG | OXYGEN SATURATION: 98 % | BODY MASS INDEX: 32.02 KG/M2 | HEIGHT: 67 IN | WEIGHT: 204 LBS | TEMPERATURE: 97 F

## 2019-09-24 DIAGNOSIS — G89.29 CHRONIC PAIN OF RIGHT KNEE: Primary | ICD-10-CM

## 2019-09-24 DIAGNOSIS — Z87.898 HISTORY OF PREDIABETES: ICD-10-CM

## 2019-09-24 DIAGNOSIS — R73.09 OTHER ABNORMAL GLUCOSE: ICD-10-CM

## 2019-09-24 DIAGNOSIS — M25.561 CHRONIC PAIN OF RIGHT KNEE: Primary | ICD-10-CM

## 2019-09-24 LAB — HBA1C MFR BLD: 6.2 %

## 2019-09-24 PROCEDURE — 83036 HEMOGLOBIN GLYCOSYLATED A1C: CPT | Performed by: NURSE PRACTITIONER

## 2019-09-24 PROCEDURE — 1036F TOBACCO NON-USER: CPT | Performed by: NURSE PRACTITIONER

## 2019-09-24 PROCEDURE — G8417 CALC BMI ABV UP PARAM F/U: HCPCS | Performed by: NURSE PRACTITIONER

## 2019-09-24 PROCEDURE — 1123F ACP DISCUSS/DSCN MKR DOCD: CPT | Performed by: NURSE PRACTITIONER

## 2019-09-24 PROCEDURE — 99213 OFFICE O/P EST LOW 20 MIN: CPT | Performed by: NURSE PRACTITIONER

## 2019-09-24 PROCEDURE — G8427 DOCREV CUR MEDS BY ELIG CLIN: HCPCS | Performed by: NURSE PRACTITIONER

## 2019-09-24 PROCEDURE — G8399 PT W/DXA RESULTS DOCUMENT: HCPCS | Performed by: NURSE PRACTITIONER

## 2019-09-24 PROCEDURE — 3017F COLORECTAL CA SCREEN DOC REV: CPT | Performed by: NURSE PRACTITIONER

## 2019-09-24 PROCEDURE — 1090F PRES/ABSN URINE INCON ASSESS: CPT | Performed by: NURSE PRACTITIONER

## 2019-09-24 PROCEDURE — 4040F PNEUMOC VAC/ADMIN/RCVD: CPT | Performed by: NURSE PRACTITIONER

## 2019-09-24 ASSESSMENT — ENCOUNTER SYMPTOMS: SHORTNESS OF BREATH: 0

## 2019-09-24 NOTE — PROGRESS NOTES
[address] [facility] Jaqueline Mcneil  [phone]      Polina Garcia is a 75 y.o. female who presents today for her  medicalconditions/complaints as noted below. Polina Garcia is c/o of Knee Pain (right,wearing brace for a yr to put off surgery,now brace is causing swelling and having trouble walking on it,would like second opinion; req referral to dr lake)  . HPI:    Pt here for referral to ortho for ongoing right knee pain. She states she has noticed some swelling and pain for the past 1 year and has been wearing an otc brace. She has seen ortho in the past and was told she needed surgery, but she would like a second opinion. She states she is barely able to walk anywhere long distance d/t worsenign pain and know brace is becoming uncomfortable and tight.        Past Medical History:   Diagnosis Date    Atrial flutter (HCC)     with palpitations    Chronic constipation     whole life    Eczema     Frequent sinus infections     GERD (gastroesophageal reflux disease)     resolved    Hemorrhoid 10/26/2016    internal and external    Hemorrhoids 8/22/2017    Hypertension     Pain     Palpitations     Tooth wear     FALSE TOOTH LOWER BOTTOM REMOVABLE PERMANENT DENTAL WORK    Wears glasses       Past Surgical History:   Procedure Laterality Date    ANUS SURGERY      fistulotomy    APPENDECTOMY  1960    BREAST BIOPSY Left 1988    COLONOSCOPY  10/26/2016    adenomatous polyps(tubular adenomas)    HYSTERECTOMY      FIBROIDS; one ovarian preservation    KNEE ARTHROSCOPY      LEFT    LUMBAR DISC SURGERY      LUMBAR FUSION  12/30/2013    Transforaminal Lumbar Interbody Fusion L4-5, S1    THYROIDECTOMY, PARTIAL      TONSILLECTOMY       Family History   Problem Relation Age of Onset    Cancer Sister         pancrease    Heart Disease Mother     Heart Disease Father     Diabetes Brother     No Known Problems Maternal Grandmother     No Known year-stable  Plan:      Return in about 1 month (around 10/24/2019), or medicare wellness visit. Orders Placed This Encounter   Procedures    External Referral To Orthopedic Surgery     Referral Priority:   Routine     Referral Type:   Consult for Advice and Opinion     Referral Reason:   Specialty Services Required     Referred to Provider:   Clarissa Robles Specialty:   Orthopedic Surgery     Number of Visits Requested:   1    POCT glycosylated hemoglobin (Hb A1C)     Referral given for second opinion for ortho per her request, please call for an appt  Continue with brace, rest, ice for now and prolonged walking    Prediabetes-stable    Patient given educational materials - see patient instructions. Discussed use,benefit, and side effects of prescribed medications. All patient questions answered. Pt voiced understanding. Reviewed health maintenance. Instructed to continue currentmedications, diet and exercise.     Electronically signed by Beverly Mckinnon CNP on 9/24/2019 at 2:29 PM

## 2019-09-30 ENCOUNTER — TELEPHONE (OUTPATIENT)
Dept: FAMILY MEDICINE CLINIC | Age: 74
End: 2019-09-30

## 2019-09-30 DIAGNOSIS — G89.29 CHRONIC PAIN OF RIGHT KNEE: Primary | ICD-10-CM

## 2019-09-30 DIAGNOSIS — M25.561 CHRONIC PAIN OF RIGHT KNEE: Primary | ICD-10-CM

## 2019-09-30 NOTE — TELEPHONE ENCOUNTER
Patient would like a new referral to orthopedic surgeon- carmen robertson  [address] [facility]  Phone number is

## 2019-10-22 ENCOUNTER — OFFICE VISIT (OUTPATIENT)
Dept: FAMILY MEDICINE CLINIC | Age: 74
End: 2019-10-22
Payer: MEDICARE

## 2019-10-22 VITALS
TEMPERATURE: 97.3 F | HEIGHT: 67 IN | BODY MASS INDEX: 31.86 KG/M2 | OXYGEN SATURATION: 97 % | WEIGHT: 203 LBS | SYSTOLIC BLOOD PRESSURE: 120 MMHG | HEART RATE: 54 BPM | DIASTOLIC BLOOD PRESSURE: 84 MMHG

## 2019-10-22 DIAGNOSIS — Z00.00 ROUTINE GENERAL MEDICAL EXAMINATION AT A HEALTH CARE FACILITY: ICD-10-CM

## 2019-10-22 DIAGNOSIS — Z12.39 SCREENING FOR BREAST CANCER: ICD-10-CM

## 2019-10-22 DIAGNOSIS — Z23 NEED FOR 23-POLYVALENT PNEUMOCOCCAL POLYSACCHARIDE VACCINE: ICD-10-CM

## 2019-10-22 DIAGNOSIS — Z00.00 MEDICARE ANNUAL WELLNESS VISIT, INITIAL: Primary | ICD-10-CM

## 2019-10-22 DIAGNOSIS — Z12.31 ENCOUNTER FOR SCREENING MAMMOGRAM FOR HIGH-RISK PATIENT: ICD-10-CM

## 2019-10-22 PROBLEM — M17.11 PRIMARY OSTEOARTHRITIS OF RIGHT KNEE: Status: ACTIVE | Noted: 2019-10-22

## 2019-10-22 PROCEDURE — G0438 PPPS, INITIAL VISIT: HCPCS | Performed by: NURSE PRACTITIONER

## 2019-10-22 PROCEDURE — G8484 FLU IMMUNIZE NO ADMIN: HCPCS | Performed by: NURSE PRACTITIONER

## 2019-10-22 PROCEDURE — 4040F PNEUMOC VAC/ADMIN/RCVD: CPT | Performed by: NURSE PRACTITIONER

## 2019-10-22 PROCEDURE — G0009 ADMIN PNEUMOCOCCAL VACCINE: HCPCS | Performed by: NURSE PRACTITIONER

## 2019-10-22 PROCEDURE — 1123F ACP DISCUSS/DSCN MKR DOCD: CPT | Performed by: NURSE PRACTITIONER

## 2019-10-22 PROCEDURE — 90732 PPSV23 VACC 2 YRS+ SUBQ/IM: CPT | Performed by: NURSE PRACTITIONER

## 2019-10-22 PROCEDURE — 3017F COLORECTAL CA SCREEN DOC REV: CPT | Performed by: NURSE PRACTITIONER

## 2019-10-22 ASSESSMENT — LIFESTYLE VARIABLES: HOW OFTEN DO YOU HAVE A DRINK CONTAINING ALCOHOL: 0

## 2019-10-22 ASSESSMENT — PATIENT HEALTH QUESTIONNAIRE - PHQ9
SUM OF ALL RESPONSES TO PHQ QUESTIONS 1-9: 0
SUM OF ALL RESPONSES TO PHQ QUESTIONS 1-9: 0

## 2019-11-19 DIAGNOSIS — Z12.39 SCREENING FOR BREAST CANCER: ICD-10-CM

## 2019-11-19 DIAGNOSIS — Z12.31 ENCOUNTER FOR SCREENING MAMMOGRAM FOR HIGH-RISK PATIENT: ICD-10-CM

## 2019-11-26 RX ORDER — NEBIVOLOL 10 MG/1
10 TABLET ORAL EVERY EVENING
Qty: 90 TABLET | Refills: 1 | Status: SHIPPED | OUTPATIENT
Start: 2019-11-26 | End: 2020-05-26

## 2019-12-02 ENCOUNTER — HOSPITAL ENCOUNTER (OUTPATIENT)
Age: 74
Setting detail: SPECIMEN
Discharge: HOME | End: 2019-12-02
Payer: MEDICARE

## 2019-12-04 ENCOUNTER — HOSPITAL ENCOUNTER (OUTPATIENT)
Age: 74
Setting detail: SPECIMEN
Discharge: HOME | End: 2019-12-04
Payer: MEDICARE

## 2019-12-04 ENCOUNTER — OFFICE VISIT (OUTPATIENT)
Dept: FAMILY MEDICINE CLINIC | Age: 74
End: 2019-12-04
Payer: MEDICARE

## 2019-12-04 VITALS
DIASTOLIC BLOOD PRESSURE: 60 MMHG | BODY MASS INDEX: 31.39 KG/M2 | HEIGHT: 67 IN | OXYGEN SATURATION: 97 % | WEIGHT: 200 LBS | HEART RATE: 50 BPM | TEMPERATURE: 97.4 F | SYSTOLIC BLOOD PRESSURE: 127 MMHG

## 2019-12-04 DIAGNOSIS — L29.9 LOCALIZED PRURITUS: Primary | ICD-10-CM

## 2019-12-04 DIAGNOSIS — Z98.890 HISTORY OF THYROID SURGERY: ICD-10-CM

## 2019-12-04 DIAGNOSIS — K59.00 CONSTIPATION, UNSPECIFIED CONSTIPATION TYPE: ICD-10-CM

## 2019-12-04 LAB
MAGNESIUM: 1.8 MG/DL (ref 1.6–2.6)
THYROXINE, FREE: 1.47 NG/DL (ref 0.93–1.7)
TSH SERPL DL<=0.05 MIU/L-ACNC: 0.66 MIU/L (ref 0.3–5)

## 2019-12-04 PROCEDURE — G8417 CALC BMI ABV UP PARAM F/U: HCPCS | Performed by: NURSE PRACTITIONER

## 2019-12-04 PROCEDURE — G8484 FLU IMMUNIZE NO ADMIN: HCPCS | Performed by: NURSE PRACTITIONER

## 2019-12-04 PROCEDURE — 1036F TOBACCO NON-USER: CPT | Performed by: NURSE PRACTITIONER

## 2019-12-04 PROCEDURE — G8399 PT W/DXA RESULTS DOCUMENT: HCPCS | Performed by: NURSE PRACTITIONER

## 2019-12-04 PROCEDURE — 4040F PNEUMOC VAC/ADMIN/RCVD: CPT | Performed by: NURSE PRACTITIONER

## 2019-12-04 PROCEDURE — G8428 CUR MEDS NOT DOCUMENT: HCPCS | Performed by: NURSE PRACTITIONER

## 2019-12-04 PROCEDURE — 99213 OFFICE O/P EST LOW 20 MIN: CPT | Performed by: NURSE PRACTITIONER

## 2019-12-04 PROCEDURE — 3017F COLORECTAL CA SCREEN DOC REV: CPT | Performed by: NURSE PRACTITIONER

## 2019-12-04 PROCEDURE — 1090F PRES/ABSN URINE INCON ASSESS: CPT | Performed by: NURSE PRACTITIONER

## 2019-12-04 PROCEDURE — 1123F ACP DISCUSS/DSCN MKR DOCD: CPT | Performed by: NURSE PRACTITIONER

## 2019-12-04 ASSESSMENT — ENCOUNTER SYMPTOMS
SHORTNESS OF BREATH: 0
CONSTIPATION: 1
ABDOMINAL PAIN: 0
ROS SKIN COMMENTS: ITCHING
NAUSEA: 0
VOMITING: 0
CHEST TIGHTNESS: 0
COUGH: 0

## 2019-12-05 LAB — SURGICAL PATHOLOGY REPORT: NORMAL

## 2020-01-23 ENCOUNTER — TELEPHONE (OUTPATIENT)
Dept: FAMILY MEDICINE CLINIC | Age: 75
End: 2020-01-23

## 2020-01-23 RX ORDER — OMEPRAZOLE 20 MG/1
20 CAPSULE, DELAYED RELEASE ORAL 2 TIMES DAILY
Qty: 180 CAPSULE | Refills: 1 | Status: SHIPPED | OUTPATIENT
Start: 2020-01-23 | End: 2020-07-03

## 2020-01-28 ENCOUNTER — HOSPITAL ENCOUNTER (OUTPATIENT)
Age: 75
Setting detail: SPECIMEN
Discharge: HOME | End: 2020-01-28
Payer: MEDICARE

## 2020-01-28 LAB
ABSOLUTE EOS #: 0.52 K/UL (ref 0–0.44)
ABSOLUTE IMMATURE GRANULOCYTE: <0.03 K/UL (ref 0–0.3)
ABSOLUTE LYMPH #: 1.81 K/UL (ref 1.1–3.7)
ABSOLUTE MONO #: 0.69 K/UL (ref 0.1–1.2)
ALP BLD-CCNC: 79 U/L (ref 35–104)
ALT SERPL-CCNC: 9 U/L (ref 5–33)
AST SERPL-CCNC: 16 U/L
BASOPHILS # BLD: 1 % (ref 0–2)
BASOPHILS ABSOLUTE: 0.05 K/UL (ref 0–0.2)
BILIRUB SERPL-MCNC: 0.91 MG/DL (ref 0.3–1.2)
BUN BLDV-MCNC: 10 MG/DL (ref 8–23)
C-REACTIVE PROTEIN: 2.9 MG/L (ref 0–5)
CALCIUM SERPL-MCNC: 9.9 MG/DL (ref 8.6–10.4)
CREAT SERPL-MCNC: 0.61 MG/DL (ref 0.5–0.9)
DIFFERENTIAL TYPE: ABNORMAL
EOSINOPHILS RELATIVE PERCENT: 7 % (ref 1–4)
GFR AFRICAN AMERICAN: >60 ML/MIN
GFR NON-AFRICAN AMERICAN: >60 ML/MIN
GFR SERPL CREATININE-BSD FRML MDRD: NORMAL ML/MIN/{1.73_M2}
GFR SERPL CREATININE-BSD FRML MDRD: NORMAL ML/MIN/{1.73_M2}
GLUCOSE BLD-MCNC: 106 MG/DL (ref 70–99)
HCT VFR BLD CALC: 37 % (ref 36.3–47.1)
HEMOGLOBIN: 12.2 G/DL (ref 11.9–15.1)
IGE: 47 IU/ML
IMMATURE GRANULOCYTES: 0 %
LYMPHOCYTES # BLD: 24 % (ref 24–43)
MCH RBC QN AUTO: 28.3 PG (ref 25.2–33.5)
MCHC RBC AUTO-ENTMCNC: 33 G/DL (ref 28.4–34.8)
MCV RBC AUTO: 85.8 FL (ref 82.6–102.9)
MONOCYTES # BLD: 9 % (ref 3–12)
NRBC AUTOMATED: 0 PER 100 WBC
PDW BLD-RTO: 13.5 % (ref 11.8–14.4)
PHOSPHORUS: 3.8 MG/DL (ref 2.6–4.5)
PLATELET # BLD: 391 K/UL (ref 138–453)
PLATELET ESTIMATE: ABNORMAL
PMV BLD AUTO: 10.8 FL (ref 8.1–13.5)
RBC # BLD: 4.31 M/UL (ref 3.95–5.11)
RBC # BLD: ABNORMAL 10*6/UL
SEDIMENTATION RATE, ERYTHROCYTE: 21 MM (ref 0–20)
SEG NEUTROPHILS: 59 % (ref 36–65)
SEGMENTED NEUTROPHILS ABSOLUTE COUNT: 4.57 K/UL (ref 1.5–8.1)
TSH SERPL DL<=0.05 MIU/L-ACNC: 0.87 MIU/L (ref 0.3–5)
WBC # BLD: 7.7 K/UL (ref 3.5–11.3)
WBC # BLD: ABNORMAL 10*3/UL

## 2020-01-29 LAB — ANTI-NUCLEAR ANTIBODY (ANA): NEGATIVE

## 2020-01-30 LAB
ALBUMIN (CALCULATED): 4.9 G/DL (ref 3.2–5.2)
ALBUMIN PERCENT: 64 % (ref 45–65)
ALPHA 1 PERCENT: 2 % (ref 3–6)
ALPHA 2 PERCENT: 8 % (ref 6–13)
ALPHA-1-GLOBULIN: 0.2 G/DL (ref 0.1–0.4)
ALPHA-2-GLOBULIN: 0.6 G/DL (ref 0.5–0.9)
BETA GLOBULIN: 0.7 G/DL (ref 0.5–1.1)
BETA PERCENT: 9 % (ref 11–19)
GAMMA GLOBULIN %: 17 % (ref 9–20)
GAMMA GLOBULIN: 1.3 G/DL (ref 0.5–1.5)
PATHOLOGIST: ABNORMAL
PROTEIN ELECTROPHORESIS, SERUM: ABNORMAL
TOTAL PROT. SUM,%: 100 % (ref 98–102)
TOTAL PROT. SUM: 7.7 G/DL (ref 6.3–8.2)
TOTAL PROTEIN: 7.7 G/DL (ref 6.4–8.3)

## 2020-03-16 ENCOUNTER — TELEPHONE (OUTPATIENT)
Dept: FAMILY MEDICINE CLINIC | Age: 75
End: 2020-03-16

## 2020-03-16 RX ORDER — POLYETHYLENE GLYCOL 3350 17 G/17G
17 POWDER, FOR SOLUTION ORAL DAILY
Qty: 1530 G | Refills: 1 | Status: SHIPPED | OUTPATIENT
Start: 2020-03-16 | End: 2020-04-15

## 2020-05-26 RX ORDER — NEBIVOLOL HYDROCHLORIDE 10 MG/1
TABLET ORAL
Qty: 90 TABLET | Refills: 3 | Status: SHIPPED | OUTPATIENT
Start: 2020-05-26 | End: 2021-08-13 | Stop reason: SINTOL

## 2020-07-03 RX ORDER — OMEPRAZOLE 20 MG/1
CAPSULE, DELAYED RELEASE ORAL
Qty: 180 CAPSULE | Refills: 3 | Status: SHIPPED | OUTPATIENT
Start: 2020-07-03 | End: 2021-06-07

## 2020-08-11 LAB
CREATININE: 0.7 MG/DL
POTASSIUM (K+): 3.5

## 2020-10-14 ENCOUNTER — OFFICE VISIT (OUTPATIENT)
Dept: FAMILY MEDICINE CLINIC | Age: 75
End: 2020-10-14
Payer: COMMERCIAL

## 2020-10-14 ENCOUNTER — TELEPHONE (OUTPATIENT)
Dept: FAMILY MEDICINE CLINIC | Age: 75
End: 2020-10-14

## 2020-10-14 VITALS
HEIGHT: 67 IN | WEIGHT: 200.8 LBS | OXYGEN SATURATION: 97 % | SYSTOLIC BLOOD PRESSURE: 128 MMHG | TEMPERATURE: 97.3 F | DIASTOLIC BLOOD PRESSURE: 75 MMHG | HEART RATE: 54 BPM | BODY MASS INDEX: 31.52 KG/M2

## 2020-10-14 PROCEDURE — 99401 PREV MED CNSL INDIV APPRX 15: CPT | Performed by: NURSE PRACTITIONER

## 2020-10-14 PROCEDURE — G8484 FLU IMMUNIZE NO ADMIN: HCPCS | Performed by: NURSE PRACTITIONER

## 2020-10-14 RX ORDER — POLYETHYLENE GLYCOL 3350 17 G/17G
17 POWDER, FOR SOLUTION ORAL DAILY PRN
COMMUNITY
End: 2021-12-04

## 2020-10-14 RX ORDER — HYDROCORTISONE ACETATE, PRAMOXINE HCL 2.5; 1 G/100G; G/100G
CREAM TOPICAL 2 TIMES DAILY
COMMUNITY

## 2020-10-14 RX ORDER — HYDRALAZINE HYDROCHLORIDE 50 MG/1
50 TABLET, FILM COATED ORAL 2 TIMES DAILY
COMMUNITY
Start: 2020-09-22

## 2020-10-14 RX ORDER — AMOXICILLIN 500 MG/1
250 CAPSULE ORAL DAILY
COMMUNITY
Start: 2020-09-28 | End: 2021-03-24

## 2020-10-14 RX ORDER — ELECTROLYTES/DEXTROSE
SOLUTION, ORAL ORAL
COMMUNITY

## 2020-10-14 RX ORDER — HYDROXYZINE HYDROCHLORIDE 25 MG/1
25 TABLET, FILM COATED ORAL NIGHTLY PRN
COMMUNITY
Start: 2019-10-09 | End: 2020-10-14

## 2020-10-14 RX ORDER — APIXABAN 5 MG/1
TABLET, FILM COATED ORAL
COMMUNITY
Start: 2020-10-01

## 2020-10-14 RX ORDER — FUROSEMIDE 40 MG/1
20 TABLET ORAL DAILY PRN
COMMUNITY
Start: 2020-08-14

## 2020-10-14 RX ORDER — ACETAMINOPHEN 500 MG
500 TABLET ORAL EVERY 6 HOURS PRN
COMMUNITY
End: 2022-02-15

## 2020-10-14 RX ORDER — CETIRIZINE HYDROCHLORIDE 10 MG/1
TABLET ORAL
COMMUNITY
Start: 2020-09-27

## 2020-10-14 ASSESSMENT — ENCOUNTER SYMPTOMS
COUGH: 0
CHEST TIGHTNESS: 0
SHORTNESS OF BREATH: 0
ABDOMINAL PAIN: 0

## 2020-10-14 ASSESSMENT — PATIENT HEALTH QUESTIONNAIRE - PHQ9
2. FEELING DOWN, DEPRESSED OR HOPELESS: 0
SUM OF ALL RESPONSES TO PHQ9 QUESTIONS 1 & 2: 0
SUM OF ALL RESPONSES TO PHQ QUESTIONS 1-9: 0
1. LITTLE INTEREST OR PLEASURE IN DOING THINGS: 0
SUM OF ALL RESPONSES TO PHQ QUESTIONS 1-9: 0

## 2020-10-14 NOTE — PROGRESS NOTES
[address]  [place], [address]  [phone]      Sylvia Florentino is a 67 y.o. female who presents today for her  medicalconditions/complaints as noted below. Sylvia Florentino is c/o of Other (ques on what she can tolerate;flu,shingles,pneumo,states she was unable to take flu inj in past due to reaction)  . HPI:    HPI  Pt. Coming in today to discuss risks with vaccines needed. She has been very cautious about her health since covid and does not leave her house much. She had some BP issues over the summer d/t watching the news to much and had her BP meds adjusted with cardio and is stable now. She states when she took the flu vaccine a few years ago she had nausea, dizziness and BP spikes which sent her to the ER d/t her feeling she may pass out. She is unsure what is in them and afraid to try again. She also wonders if the pneumovax is needed. She did tolerate this before. She did have shingles last year and wonders if she needs the vaccines.    Past Medical History:   Diagnosis Date    Atrial flutter (HCC)     with palpitations    Chronic constipation     whole life    Eczema     Frequent sinus infections     GERD (gastroesophageal reflux disease)     resolved    Hemorrhoid 10/26/2016    internal and external    Hemorrhoids 8/22/2017    Hypertension     Pain     Palpitations     Shingles     Tooth wear     FALSE TOOTH LOWER BOTTOM REMOVABLE PERMANENT DENTAL WORK    Wears glasses       Past Surgical History:   Procedure Laterality Date    ANUS SURGERY      fistulotomy    APPENDECTOMY  1960    BREAST BIOPSY Left 1988    COLONOSCOPY  10/26/2016    adenomatous polyps(tubular adenomas)    HYSTERECTOMY      FIBROIDS; one ovarian preservation    KNEE ARTHROSCOPY      LEFT    LUMBAR DISC SURGERY      LUMBAR FUSION  12/30/2013    Transforaminal Lumbar Interbody Fusion L4-5, S1    THYROIDECTOMY, PARTIAL      TONSILLECTOMY       Family History   Problem Relation Age of Onset    Cancer Sister         pancrease    Heart Disease Mother     Heart Disease Father     Diabetes Brother     No Known Problems Maternal Grandmother     No Known Problems Maternal Grandfather     No Known Problems Paternal Grandmother     No Known Problems Paternal Grandfather      Social History     Tobacco Use    Smoking status: Former Smoker     Packs/day: 0.25     Years: 5.00     Pack years: 1.25     Types: Cigarettes     Last attempt to quit: 1977     Years since quittin.2    Smokeless tobacco: Never Used    Tobacco comment: QUIT SMOKING YRS AGO    Substance Use Topics    Alcohol use:  Yes     Alcohol/week: 1.0 standard drinks     Types: 1 Glasses of wine per week     Comment: OCC WINE      Current Outpatient Medications   Medication Sig Dispense Refill    hydrALAZINE (APRESOLINE) 50 MG tablet Take 50 mg by mouth 2 times daily      furosemide (LASIX) 40 MG tablet Take 20 mg by mouth daily as needed      cetirizine (ZYRTEC) 10 MG tablet       amoxicillin (AMOXIL) 500 MG capsule Take 250 mg by mouth daily       acetaminophen (TYLENOL) 500 MG tablet Take 500 mg by mouth every 6 hours as needed      FIBER, CORN DEXTRIN, PO Take 3 g by mouth      Pramoxine-HC (HYDROCORTISONE-PRAMOXINE) 2.5-1 % CREA cream Apply topically 2 times daily      Magnesium Chloride 64 MG TABS Take by mouth      polyethylene glycol (GLYCOLAX) 17 g packet Take 17 g by mouth daily as needed for Constipation      omeprazole (PRILOSEC) 20 MG delayed release capsule TAKE 1 CAPSULE TWICE A  capsule 3    BYSTOLIC 10 MG tablet TAKE 1 TABLET EVERY EVENING (Patient taking differently: Take 5 mg by mouth nightly ) 90 tablet 3    cloNIDine (CATAPRES-TTS-3) 0.3 MG/24HR PTWK Place 1 patch onto the skin once a week 4 patch 0    Lactobacillus (MORE-DOPHILUS ACIDOPHILUS PO) Take 1 tablet by mouth daily      losartan-hydrochlorothiazide (HYZAAR) 100-25 MG per tablet Take 1 tablet by mouth nightly       ELIQUIS 5 MG TABS tablet       Emollient (EUCERIN EX) Apply topically daily      Coconut Oil OIL Apply topically to scars      Misc. Devices MISC Lateral  knee brace for the right knee 1 Device 0     No current facility-administered medications for this visit. Allergies   Allergen Reactions    Codeine      Feels like will pass out    Other Other (See Comments)     Red dyes dizziness     Cortisone     Flu Virus Vaccine Nausea And Vomiting    Synthroid [Levothyroxine Sodium] Nausea And Vomiting       Health Maintenance   Topic Date Due    Potassium monitoring  06/04/2020    Annual Wellness Visit (AWV)  10/22/2020    Shingles Vaccine (1 of 2) 07/06/2021 (Originally 3/27/1994)    DTaP/Tdap/Td vaccine (1 - Tdap) 10/14/2021 (Originally [birth year]27/1964)    Creatinine monitoring  01/28/2021    Pneumococcal 65+ years Vaccine  Completed    DEXA (modify frequency per FRAX score)  Addressed    Hepatitis A vaccine  Aged Out    Hepatitis B vaccine  Aged Out    Hib vaccine  Aged Out    Meningococcal (ACWY) vaccine  Aged Out       Subjective:      Review of Systems   Constitutional: Negative for appetite change, chills, diaphoresis, fatigue and fever. Respiratory: Negative for cough, chest tightness and shortness of breath. Cardiovascular: Negative for chest pain, palpitations and leg swelling. Gastrointestinal: Negative for abdominal pain. Musculoskeletal: Negative for arthralgias and myalgias. Neurological: Negative for dizziness, weakness and headaches. Hematological: Negative for adenopathy. Psychiatric/Behavioral: Negative for dysphoric mood and sleep disturbance. The patient is not nervous/anxious. Objective:      Physical Exam  Vitals signs and nursing note reviewed. Constitutional:       Appearance: Normal appearance. She is not ill-appearing or diaphoretic. HENT:      Head: Normocephalic and atraumatic.    Eyes:      Conjunctiva/sclera: Conjunctivae normal.   Pulmonary:      Effort: Pulmonary effort is normal.   Skin:     General: Skin is warm and dry. Neurological:      General: No focal deficit present. Mental Status: She is alert and oriented to person, place, and time. Psychiatric:         Mood and Affect: Mood normal.         Behavior: Behavior normal.         Thought Content: Thought content normal.         Judgment: Judgment normal.         Assessment:       Diagnosis Orders   1. Health education/counseling         Plan:      Return if symptoms worsen or fail to improve. Orders Placed This Encounter   Procedures    Potassium     This order was created through External Result Entry     Pt. Advised with such a significant reaction to flu vaccine to have taken her to the ER, I do feel the benefits do not outweigh the risk. She is very concerned about getting it. She states she is planning to stay in her home as often as she can and limit her exposure to the public when able. She will wear her mask when outside of home and practice good handwashing. She is aware of risk of getting influenza though and accepts that risk. I advised she does not need any more pneumococcal vaccines , done in 2018/19    As for the shingles, current evidence states re occurrence rate for shingles is < 7% and risk for side effects with vaccine are much higher. She will discuss more with the pharmacist and make this decision on her own, but she is not sure she will get them. Patient given educational materials - see patient instructions. Discussed use,benefit, and side effects of prescribed medications. All patient questions answered. Pt voiced understanding. Reviewed health maintenance. Instructed to continue currentmedications, diet and exercise.     Electronically signed by Tamir Mckinnon CNP on 10/14/2020 at 11:47 AM

## 2021-01-06 ENCOUNTER — HOSPITAL ENCOUNTER (OUTPATIENT)
Age: 76
Setting detail: SPECIMEN
Discharge: HOME | End: 2021-01-06
Payer: MEDICARE

## 2021-01-06 LAB
T3 FREE: 2.66 PG/ML (ref 2.02–4.43)
THYROXINE, FREE: 1.38 NG/DL (ref 0.93–1.7)
TSH SERPL DL<=0.05 MIU/L-ACNC: 0.79 MIU/L (ref 0.3–5)

## 2021-01-18 ENCOUNTER — OFFICE VISIT (OUTPATIENT)
Dept: FAMILY MEDICINE CLINIC | Age: 76
End: 2021-01-18
Payer: MEDICARE

## 2021-01-18 VITALS
SYSTOLIC BLOOD PRESSURE: 122 MMHG | TEMPERATURE: 97.2 F | HEART RATE: 50 BPM | BODY MASS INDEX: 31.42 KG/M2 | DIASTOLIC BLOOD PRESSURE: 72 MMHG | HEIGHT: 67 IN | OXYGEN SATURATION: 97 % | WEIGHT: 200.2 LBS

## 2021-01-18 DIAGNOSIS — Z86.39 HISTORY OF THYROID DISEASE: ICD-10-CM

## 2021-01-18 DIAGNOSIS — R00.2 HEART PALPITATIONS: ICD-10-CM

## 2021-01-18 DIAGNOSIS — K59.00 CONSTIPATION, UNSPECIFIED CONSTIPATION TYPE: ICD-10-CM

## 2021-01-18 DIAGNOSIS — Z00.00 MEDICARE ANNUAL WELLNESS VISIT, SUBSEQUENT: Primary | ICD-10-CM

## 2021-01-18 PROCEDURE — 4040F PNEUMOC VAC/ADMIN/RCVD: CPT | Performed by: NURSE PRACTITIONER

## 2021-01-18 PROCEDURE — 1123F ACP DISCUSS/DSCN MKR DOCD: CPT | Performed by: NURSE PRACTITIONER

## 2021-01-18 PROCEDURE — G0439 PPPS, SUBSEQ VISIT: HCPCS | Performed by: NURSE PRACTITIONER

## 2021-01-18 PROCEDURE — G8484 FLU IMMUNIZE NO ADMIN: HCPCS | Performed by: NURSE PRACTITIONER

## 2021-01-18 ASSESSMENT — PATIENT HEALTH QUESTIONNAIRE - PHQ9
SUM OF ALL RESPONSES TO PHQ QUESTIONS 1-9: 0
2. FEELING DOWN, DEPRESSED OR HOPELESS: 0
SUM OF ALL RESPONSES TO PHQ9 QUESTIONS 1 & 2: 0
SUM OF ALL RESPONSES TO PHQ QUESTIONS 1-9: 0
1. LITTLE INTEREST OR PLEASURE IN DOING THINGS: 0

## 2021-01-18 ASSESSMENT — LIFESTYLE VARIABLES: HOW OFTEN DO YOU HAVE A DRINK CONTAINING ALCOHOL: 0

## 2021-01-18 NOTE — PROGRESS NOTES
Visit Information    Have you changed or started any medications since your last visit including any over-the-counter medicines, vitamins, or herbal medicines? no   Have you stopped taking any of your medications? Is so, why? -  no  Are you having any side effects from any of your medications? - no    Have you seen any other physician or provider since your last visit?  no   Have you had any other diagnostic tests since your last visit?  no   Have you been seen in the emergency room and/or had an admission in a hospital since we last saw you?  no   Have you had your routine dental cleaning in the past 6 months?  no     Do you have an active MyChart account? If no, what is the barrier?   No: na    Patient Care Team:  JULIA Mujica CNP as PCP - General (Nurse Practitioner)  JULIA Mujica CNP as PCP - [facility]    Medical History Review  Past Medical, Family, and Social History reviewed and  contribute to the patient presenting condition    Health Maintenance   Topic Date Due    Hepatitis C screen  1945    Annual Wellness Visit (AWV)  05/29/2019    Creatinine monitoring  01/28/2021    Shingles Vaccine (1 of 2) 07/06/2021 (Originally 3/27/1994)    DTaP/Tdap/Td vaccine (1 - Tdap) 10/14/2021 (Originally [birth year]27/1964)    Potassium monitoring  08/11/2021    DEXA (modify frequency per FRAX score)  Completed    Pneumococcal 65+ years Vaccine  Completed    Hepatitis A vaccine  Aged Out    Hepatitis B vaccine  Aged Out    Hib vaccine  Aged Out    Meningococcal (ACWY) vaccine  Aged Out

## 2021-01-18 NOTE — PROGRESS NOTES
Medicare Annual Wellness Visit  Name: Yary Damian Date: 2021   MRN: [id number] Sex: Female   Age: 67 y.o. Ethnicity: Non-/Non    : 1945 Race: [employer] is here for JAS Juarez (ordered by Dr Emre Good, was supposed to fax them to our office) and Medicare AWV    Screenings for behavioral, psychosocial and functional/safety risks, and cognitive dysfunction are all negative except as indicated below. These results, as well as other patient data from the [address] form, are documented in Flowsheets linked to this Encounter. Allergies   Allergen Reactions    Codeine      Feels like will pass out    Other Other (See Comments)     Red dyes dizziness     Cortisone     Flu Virus Vaccine Nausea And Vomiting    Synthroid [Levothyroxine Sodium] Nausea And Vomiting         Prior to Visit Medications    Medication Sig Taking?  Authorizing Provider   hydrALAZINE (APRESOLINE) 50 MG tablet Take 50 mg by mouth 2 times daily Yes Historical Provider, MD   furosemide (LASIX) 40 MG tablet Take 20 mg by mouth daily as needed Yes Historical Provider, MD   cetirizine (ZYRTEC) 10 MG tablet  Yes Historical Provider, MD   ELIQUIS 5 MG TABS tablet  Yes Historical Provider, MD   acetaminophen (TYLENOL) 500 MG tablet Take 500 mg by mouth every 6 hours as needed Yes Historical Provider, MD   FIBER, CORN DEXTRIN, PO Take 3 g by mouth Yes Historical Provider, MD   Pramoxine-HC (HYDROCORTISONE-PRAMOXINE) 2.5-1 % CREA cream Apply topically 2 times daily Yes Historical Provider, MD   Magnesium Chloride 64 MG TABS Take by mouth Yes Historical Provider, MD   polyethylene glycol (GLYCOLAX) 17 g packet Take 17 g by mouth daily as needed for Constipation Yes Historical Provider, MD   omeprazole (PRILOSEC) 20 MG delayed release capsule TAKE 1 CAPSULE TWICE A DAY Yes JULIA Cabrera - CNP   BYSTOLIC 10 MG tablet TAKE 1 TABLET EVERY EVENING  No Known Problems Paternal Grandmother     No Known Problems Paternal Grandfather        CareTeam (Including outside providers/suppliers regularly involved in providing care):   Patient Care Team:  JULIA Mujica CNP as PCP - General (Nurse Practitioner)  JULIA Mujica CNP as PCP - [facility] Empaneled Provider    Wt Readings from Last 3 Encounters:   01/18/21 200 lb 3.2 oz (90.8 kg)   10/14/20 200 lb 12.8 oz (91.1 kg)   12/04/19 200 lb (90.7 kg)     Vitals:    01/18/21 1031   BP: 122/72   Site: Right Upper Arm   Position: Sitting   Cuff Size: Medium Adult   Pulse: 50   Temp: 97.2 °F (36.2 °C)   TempSrc: Temporal   SpO2: 97%   Weight: 200 lb 3.2 oz (90.8 kg)   Height: 5' 7\" (1.702 m)     Body mass index is 31.36 kg/m². Based upon direct observation of the patient, evaluation of cognition reveals recent and remote memory intact. General Appearance: alert and oriented to person, place and time, well-developed and well-nourished, in no acute distress  Neck: neck supple and non tender without mass, no thyromegaly or thyroid nodules, no cervical lymphadenopathy, no thyroid nodules and no cervical adenopathy   Neurologic: gait and coordination normal and speech normal    Patient's complete Health Risk Assessment and screening values have been reviewed and are found in Flowsheets. The following problems were reviewed today and where indicated follow up appointments were made and/or referrals ordered. Positive Risk Factor Screenings with Interventions:            General Health and ACP:  General  In general, how would you say your health is?: Good  In the past 7 days, have you experienced any of the following?  New or Increased Pain, New or Increased Fatigue, Loneliness, Social Isolation, Stress or Anger?: None of These  Do you get the social and emotional support that you need?: Yes  Do you have a Living Will?: Yes  Advance Directives Power of  Living Will ACP-Advance Directive ACP-Power of     Not on File Not on File Not on File Not on File      General Health Risk Interventions:  · No Living Will: ACP documents already completed- patient asked to provide copy to the office    Health Habits/Nutrition:  Health Habits/Nutrition  Do you exercise for at least 20 minutes 2-3 times per week?: Yes  Have you lost any weight without trying in the past 3 months?: No  Do you eat fewer than 2 meals per day?: No  Have you seen a dentist within the past year?: Yes  Body mass index: (!) 31.35  Health Habits/Nutrition Interventions:  · Nutritional issues:  patient is not ready to address his/her nutritional/weight issues at this time     Safety:  Safety  Do you have working smoke detectors?: Yes  Have all throw rugs been removed or fastened?: Yes  Do you have non-slip mats or surfaces in all bathtubs/showers?: Yes  Do all of your stairways have a railing or banister?: (!) No(no stairs)  Are your doorways, halls and stairs free of clutter?: Yes  Do you always fasten your seatbelt when you are in a car?: Yes  Safety Interventions:  · Home safety tips provided     Personalized Preventive Plan   Current Health Maintenance Status  Immunization History   Administered Date(s) Administered    Pneumococcal Conjugate 13-valent ([id number]) 10/02/2018    Pneumococcal Polysaccharide ([id number]) 10/22/2019        Health Maintenance   Topic Date Due    Hepatitis C screen  1945    Annual Wellness Visit (AWV)  05/29/2019    Creatinine monitoring  01/28/2021    Shingles Vaccine (1 of 2) 07/06/2021 (Originally 3/27/1994)    DTaP/Tdap/Td vaccine (1 - Tdap) 10/14/2021 (Originally 3/27/1963)    Potassium monitoring  08/11/2021    DEXA (modify frequency per FRAX score)  Completed    Pneumococcal 65+ years Vaccine  Completed    Hepatitis A vaccine  Aged Out    Hepatitis B vaccine  Aged Out    Hib vaccine  Aged Out

## 2021-01-18 NOTE — PATIENT INSTRUCTIONS
Personalized Preventive Plan for Faizan Davis - 1/18/2021  Medicare offers a range of preventive health benefits. Some of the tests and screenings are paid in full while other may be subject to a deductible, co-insurance, and/or copay. Some of these benefits include a comprehensive review of your medical history including lifestyle, illnesses that may run in your family, and various assessments and screenings as appropriate. After reviewing your medical record and screening and assessments performed today your provider may have ordered immunizations, labs, imaging, and/or referrals for you. A list of these orders (if applicable) as well as your Preventive Care list are included within your After Visit Summary for your review. Other Preventive Recommendations:    · A preventive eye exam performed by an eye specialist is recommended every 1-2 years to screen for glaucoma; cataracts, macular degeneration, and other eye disorders. · A preventive dental visit is recommended every 6 months. · Try to get at least 150 minutes of exercise per week or 10,000 steps per day on a pedometer . · Order or download the FREE \"Exercise & Physical Activity: Your Everyday Guide\" from [employer]. Call 9-825.740.6453 or search [employer] online. · You need 0768-5457 mg of calcium and 7106-4298 IU of vitamin D per day. It is possible to meet your calcium requirement with diet alone, but a vitamin D supplement is usually necessary to meet this goal.  · When exposed to the sun, use a sunscreen that protects against both UVA and UVB radiation with an SPF of 30 or greater. Reapply every 2 to 3 hours or after sweating, drying off with a towel, or swimming. · Always wear a seat belt when traveling in a car. Always wear a helmet when riding a bicycle or motorcycle.

## 2021-01-25 ENCOUNTER — TELEPHONE (OUTPATIENT)
Dept: FAMILY MEDICINE CLINIC | Age: 76
End: 2021-01-25

## 2021-03-24 ENCOUNTER — OFFICE VISIT (OUTPATIENT)
Dept: FAMILY MEDICINE CLINIC | Age: 76
End: 2021-03-24
Payer: MEDICARE

## 2021-03-24 VITALS
BODY MASS INDEX: 30.86 KG/M2 | TEMPERATURE: 97.1 F | HEIGHT: 67 IN | HEART RATE: 59 BPM | WEIGHT: 196.6 LBS | OXYGEN SATURATION: 98 % | SYSTOLIC BLOOD PRESSURE: 138 MMHG | DIASTOLIC BLOOD PRESSURE: 72 MMHG

## 2021-03-24 DIAGNOSIS — I10 ESSENTIAL HYPERTENSION: ICD-10-CM

## 2021-03-24 DIAGNOSIS — R00.2 HEART PALPITATIONS: Primary | ICD-10-CM

## 2021-03-24 DIAGNOSIS — Z11.59 ENCOUNTER FOR HEPATITIS C SCREENING TEST FOR LOW RISK PATIENT: ICD-10-CM

## 2021-03-24 DIAGNOSIS — E78.2 MIXED HYPERLIPIDEMIA: ICD-10-CM

## 2021-03-24 DIAGNOSIS — Z00.00 ROUTINE GENERAL MEDICAL EXAMINATION AT A HEALTH CARE FACILITY: ICD-10-CM

## 2021-03-24 PROCEDURE — 1090F PRES/ABSN URINE INCON ASSESS: CPT | Performed by: NURSE PRACTITIONER

## 2021-03-24 PROCEDURE — G8417 CALC BMI ABV UP PARAM F/U: HCPCS | Performed by: NURSE PRACTITIONER

## 2021-03-24 PROCEDURE — 1123F ACP DISCUSS/DSCN MKR DOCD: CPT | Performed by: NURSE PRACTITIONER

## 2021-03-24 PROCEDURE — G8399 PT W/DXA RESULTS DOCUMENT: HCPCS | Performed by: NURSE PRACTITIONER

## 2021-03-24 PROCEDURE — 99213 OFFICE O/P EST LOW 20 MIN: CPT | Performed by: NURSE PRACTITIONER

## 2021-03-24 PROCEDURE — G8484 FLU IMMUNIZE NO ADMIN: HCPCS | Performed by: NURSE PRACTITIONER

## 2021-03-24 PROCEDURE — 1036F TOBACCO NON-USER: CPT | Performed by: NURSE PRACTITIONER

## 2021-03-24 PROCEDURE — 4040F PNEUMOC VAC/ADMIN/RCVD: CPT | Performed by: NURSE PRACTITIONER

## 2021-03-24 PROCEDURE — G8427 DOCREV CUR MEDS BY ELIG CLIN: HCPCS | Performed by: NURSE PRACTITIONER

## 2021-03-24 ASSESSMENT — ENCOUNTER SYMPTOMS
VOMITING: 0
CHEST TIGHTNESS: 0
SHORTNESS OF BREATH: 0
COUGH: 0
ABDOMINAL PAIN: 0
NAUSEA: 0

## 2021-03-24 NOTE — PROGRESS NOTES
[address]  [place], [facility] E Jaqueline Mcneil  [phone]      Tamia Barr is a 67 y.o. female who presents today for her  medicalconditions/complaints as noted below. Tamia Barr is c/o of JAS Juarez (requesting potassium levels, pt is on blood thinners and drinks alot of water,requesting refill on compound medication)  . HPI:    HPI  Pt. Here today for lab request. She wonders about her KCL level as she drinks a lot of water. She does suffer from ongoing heart palpitations and sees cardio also. She denies any other symptoms or problems with meds. She would like refill on diltiazem gel for rectal spasms. Had first covid vaccine and no problems with it.    Past Medical History:   Diagnosis Date    Atrial flutter (HCC)     with palpitations    Chronic constipation     whole life    Eczema     Frequent sinus infections     GERD (gastroesophageal reflux disease)     resolved    Hemorrhoid 10/26/2016    internal and external    Hemorrhoids 8/22/2017    Hypertension     Pain     Palpitations     Shingles     Tooth wear     FALSE TOOTH LOWER BOTTOM REMOVABLE PERMANENT DENTAL WORK    Wears glasses       Past Surgical History:   Procedure Laterality Date    ANUS SURGERY      fistulotomy    APPENDECTOMY  1960    BREAST BIOPSY Left 1988    COLONOSCOPY  10/26/2016    adenomatous polyps(tubular adenomas)    HYSTERECTOMY      FIBROIDS; one ovarian preservation    KNEE ARTHROSCOPY      LEFT    LUMBAR DISC SURGERY      LUMBAR FUSION  12/30/2013    Transforaminal Lumbar Interbody Fusion L4-5, S1    THYROIDECTOMY, PARTIAL      TONSILLECTOMY       Family History   Problem Relation Age of Onset    Cancer Sister         pancrease    Heart Disease Mother     Heart Disease Father     Diabetes Brother     No Known Problems Maternal Grandmother     No Known Problems Maternal Grandfather     No Known Problems Paternal Grandmother     No Known Problems Paternal Grandfather      Social History     Tobacco Use    Smoking status: Former Smoker     Packs/day: 0.25     Years: 5.00     Pack years: 1.25     Types: Cigarettes     Quit date: 1977     Years since quittin.6    Smokeless tobacco: Never Used    Tobacco comment: QUIT SMOKING YRS AGO    Substance Use Topics    Alcohol use: Yes     Alcohol/week: 1.0 standard drinks     Types: 1 Glasses of wine per week     Comment: OCC WINE      Current Outpatient Medications   Medication Sig Dispense Refill    hydrALAZINE (APRESOLINE) 50 MG tablet Take 50 mg by mouth 2 times daily      furosemide (LASIX) 40 MG tablet Take 20 mg by mouth daily as needed      cetirizine (ZYRTEC) 10 MG tablet       ELIQUIS 5 MG TABS tablet       acetaminophen (TYLENOL) 500 MG tablet Take 500 mg by mouth every 6 hours as needed      FIBER, CORN DEXTRIN, PO Take 3 g by mouth      Pramoxine-HC (HYDROCORTISONE-PRAMOXINE) 2.5-1 % CREA cream Apply topically 2 times daily      Magnesium Chloride 64 MG TABS Take by mouth      polyethylene glycol (GLYCOLAX) 17 g packet Take 17 g by mouth daily as needed for Constipation      omeprazole (PRILOSEC) 20 MG delayed release capsule TAKE 1 CAPSULE TWICE A  capsule 3    BYSTOLIC 10 MG tablet TAKE 1 TABLET EVERY EVENING (Patient taking differently: Take 5 mg by mouth nightly ) 90 tablet 3    cloNIDine (CATAPRES-TTS-3) 0.3 MG/24HR PTWK Place 1 patch onto the skin once a week 4 patch 0    Lactobacillus (MORE-DOPHILUS ACIDOPHILUS PO) Take 1 tablet by mouth daily      Emollient (EUCERIN EX) Apply topically daily      Coconut Oil OIL Apply topically to scars      Misc. Devices MISC Lateral  knee brace for the right knee 1 Device 0    losartan-hydrochlorothiazide (HYZAAR) 100-25 MG per tablet Take 1 tablet by mouth nightly        No current facility-administered medications for this visit.       Allergies   Allergen Reactions    Codeine      Feels like will pass out    Other Other (See Comments)     Red dyes dizziness     Cortisone     Flu Virus Vaccine Nausea And Vomiting    Synthroid [Levothyroxine Sodium] Nausea And Vomiting       Health Maintenance   Topic Date Due    Hepatitis C screen  Never done    Shingles Vaccine (1 of 2) 07/06/2021 (Originally [birth year]/27/1995)    DTaP/Tdap/Td vaccine (1 - Tdap) 10/14/2021 (Originally 3/27/1964)    COVID-19 Vaccine (2 - Moderna 2-dose series) 04/05/2021    Potassium monitoring  08/11/2021    Creatinine monitoring  08/11/2021    Annual Wellness Visit (AWV)  01/19/2022    DEXA (modify frequency per FRAX score)  Completed    Pneumococcal 65+ years Vaccine  Completed    Hepatitis A vaccine  Aged Out    Hepatitis B vaccine  Aged Out    Hib vaccine  Aged Out    Meningococcal (ACWY) vaccine  Aged Out       Subjective:      Review of Systems   Constitutional: Negative for activity change, appetite change, chills, diaphoresis, fatigue and fever. Eyes: Negative for visual disturbance. Respiratory: Negative for cough, chest tightness and shortness of breath. Cardiovascular: Negative for chest pain, palpitations and leg swelling. Gastrointestinal: Negative for abdominal pain, nausea and vomiting. Rectal spasms     Genitourinary: Negative for difficulty urinating. Musculoskeletal: Negative for gait problem. Skin: Negative for rash. Neurological: Negative for dizziness, weakness, light-headedness, numbness and headaches. Hematological: Negative for adenopathy. Psychiatric/Behavioral: Negative for sleep disturbance. The patient is not nervous/anxious. Objective:      Physical Exam  Vitals signs and nursing note reviewed. Constitutional:       General: She is not in acute distress. Appearance: Normal appearance. She is well-developed. She is not ill-appearing or diaphoretic. HENT:      Head: Normocephalic and atraumatic.    Eyes:      Conjunctiva/sclera: Conjunctivae normal.   Neck: Musculoskeletal: Neck supple. Vascular: No JVD. Cardiovascular:      Rate and Rhythm: Normal rate and regular rhythm. Heart sounds: Normal heart sounds. Comments: NO LE edema  Pulmonary:      Effort: Pulmonary effort is normal.      Breath sounds: Normal breath sounds. Skin:     General: Skin is warm and dry. Neurological:      General: No focal deficit present. Mental Status: She is alert and oriented to person, place, and time. Mental status is at baseline. Psychiatric:         Mood and Affect: Mood normal.         Behavior: Behavior normal.         Thought Content: Thought content normal.         Judgment: Judgment normal.         Assessment:       Diagnosis Orders   1. Heart palpitations  Comprehensive Metabolic Panel    CBC Auto Differential   2. Essential hypertension  Comprehensive Metabolic Panel    CBC Auto Differential   3. Mixed hyperlipidemia  Lipid Panel   4. Encounter for hepatitis C screening test for low risk patient  Hepatitis C Antibody   5. Routine general medical examination at a health care facility  Type And Screen     Lab Results   Component Value Date    TSH 0.87 01/28/2020     Lab Results   Component Value Date    MG 1.8 12/04/2019     Wt Readings from Last 3 Encounters:   03/24/21 196 lb 9.6 oz (89.2 kg)   01/18/21 200 lb 3.2 oz (90.8 kg)   10/14/20 200 lb 12.8 oz (91.1 kg)     BP Readings from Last 3 Encounters:   03/24/21 138/72   01/18/21 122/72   10/14/20 128/75       Plan:      Return if symptoms worsen or fail to improve.   Orders Placed This Encounter   Procedures    Creatinine     This order was created through External Result Entry    Lipid Panel     PT FASTING 8-12 HOURS     Standing Status:   Future     Standing Expiration Date:   3/24/2022     Order Specific Question:   Is Patient Fasting?/# of Hours     Answer:   8-12 HOURS    Comprehensive Metabolic Panel     Standing Status:   Future     Standing Expiration Date:   3/24/2022    CBC Auto Differential     Standing Status:   Future     Standing Expiration Date:   3/24/2022    Hepatitis C Antibody     Standing Status:   Future     Standing Expiration Date:   3/25/2022    Type And Screen     Standing Status:   Future     Standing Expiration Date:   3/24/2022     Update routine labs  Will call with test results  Continue same meds for now and will specialists  Hard rx given for diltiazem gel 2 % for rectal spasms  Patient advised to follow heart healthy, low fat  diet and 150 minutes of cardiovascular exercise per week   Ok to continue with second covid vaccine when due      Patient given educational materials - see patient instructions. Discussed use,benefit, and side effects of prescribed medications. All patient questions answered. Pt voiced understanding. Reviewed health maintenance. Instructed to continue currentmedications, diet and exercise.     Electronically signed by Renay Mckinnon CNP on 3/24/2021 at 12:31 PM

## 2021-03-24 NOTE — PROGRESS NOTES
Visit Information    Have you changed or started any medications since your last visit including any over-the-counter medicines, vitamins, or herbal medicines? no   Have you stopped taking any of your medications? Is so, why? -  no  Are you having any side effects from any of your medications? - no    Have you seen any other physician or provider since your last visit?  no   Have you had any other diagnostic tests since your last visit?  no   Have you been seen in the emergency room and/or had an admission in a hospital since we last saw you?  no   Have you had your routine dental cleaning in the past 6 months?  no     Do you have an active MyChart account? If no, what is the barrier?   No: na    Patient Care Team:  JULIA Faustin CNP as PCP - General (Nurse Practitioner)  JULIA Faustin CNP as PCP - [facility] Provider    Medical History Review  Past Medical, Family, and Social History reviewed and  contribute to the patient presenting condition    Health Maintenance   Topic Date Due    Hepatitis C screen  Never done    COVID-19 Vaccine (1) Never done    Creatinine monitoring  01/28/2021    Shingles Vaccine (1 of 2) 07/06/2021 (Originally 3/27/1994)    DTaP/Tdap/Td vaccine (1 - Tdap) 10/14/2021 (Originally 3/27/1964)    Potassium monitoring  08/11/2021    Annual Wellness Visit (AWV)  01/19/2022    DEXA (modify frequency per FRAX score)  Completed    Pneumococcal 65+ years Vaccine  Completed    Hepatitis A vaccine  Aged Out    Hepatitis B vaccine  Aged Out    Hib vaccine  Aged Out    Meningococcal (ACWY) vaccine  Aged Out

## 2021-03-30 LAB
ABO/RH: NORMAL
ANTIBODY SCREEN: NORMAL
RH FACTOR: NORMAL
SPECIMEN EXPIRATION: NORMAL

## 2021-03-31 LAB
ALBUMIN SERPL-MCNC: 4.2 G/DL
ALP BLD-CCNC: 57 U/L
ALT SERPL-CCNC: 9 U/L
ANION GAP SERPL CALCULATED.3IONS-SCNC: 8 MMOL/L
ANTIBODY: NONREACTIVE
AST SERPL-CCNC: 16 U/L
BASOPHILS ABSOLUTE: 0 /ΜL
BASOPHILS RELATIVE PERCENT: 0.4 %
BILIRUB SERPL-MCNC: 1.1 MG/DL (ref 0.1–1.4)
BUN BLDV-MCNC: 10 MG/DL
CALCIUM SERPL-MCNC: 9.3 MG/DL
CHLORIDE BLD-SCNC: 102 MMOL/L
CHOLESTEROL, TOTAL: 183 MG/DL
CHOLESTEROL/HDL RATIO: 3.3
CO2: 32 MMOL/L
CREAT SERPL-MCNC: 0.71 MG/DL
EOSINOPHILS ABSOLUTE: 0.2 /ΜL
EOSINOPHILS RELATIVE PERCENT: 3.4 %
GFR CALCULATED: NORMAL
GLUCOSE BLD-MCNC: 106 MG/DL
HCT VFR BLD CALC: 37.1 % (ref 36–46)
HDLC SERPL-MCNC: 56 MG/DL (ref 35–70)
HEMOGLOBIN: 12.5 G/DL (ref 12–16)
LDL CHOLESTEROL CALCULATED: 113 MG/DL (ref 0–160)
LYMPHOCYTES ABSOLUTE: 1.4 /ΜL
LYMPHOCYTES RELATIVE PERCENT: 28 %
MCH RBC QN AUTO: 29 PG
MCHC RBC AUTO-ENTMCNC: 33.8 G/DL
MCV RBC AUTO: 86 FL
MONOCYTES ABSOLUTE: 0.4 /ΜL
MONOCYTES RELATIVE PERCENT: 8.5 %
NEUTROPHILS ABSOLUTE: 3 /ΜL
NEUTROPHILS RELATIVE PERCENT: 59.7 %
NONHDLC SERPL-MCNC: NORMAL MG/DL
PDW BLD-RTO: 13.5 %
PLATELET # BLD: 307 K/ΜL
PMV BLD AUTO: 8.4 FL
POTASSIUM SERPL-SCNC: 3.6 MMOL/L
RBC # BLD: 4.32 10^6/ΜL
SODIUM BLD-SCNC: 142 MMOL/L
TOTAL PROTEIN: 7
TRIGL SERPL-MCNC: 70 MG/DL
VLDLC SERPL CALC-MCNC: 14 MG/DL
WBC # BLD: 5.1 10^3/ML

## 2021-04-02 DIAGNOSIS — R00.2 HEART PALPITATIONS: ICD-10-CM

## 2021-04-02 DIAGNOSIS — I10 ESSENTIAL HYPERTENSION: ICD-10-CM

## 2021-04-02 DIAGNOSIS — Z00.00 ROUTINE GENERAL MEDICAL EXAMINATION AT A HEALTH CARE FACILITY: ICD-10-CM

## 2021-04-02 DIAGNOSIS — Z11.59 ENCOUNTER FOR HEPATITIS C SCREENING TEST FOR LOW RISK PATIENT: ICD-10-CM

## 2021-04-02 DIAGNOSIS — E78.2 MIXED HYPERLIPIDEMIA: ICD-10-CM

## 2021-05-25 ENCOUNTER — TELEPHONE (OUTPATIENT)
Dept: FAMILY MEDICINE CLINIC | Age: 76
End: 2021-05-25

## 2021-05-25 NOTE — TELEPHONE ENCOUNTER
Patient called stating she is seeing Dr Mehrdad Medrano for derm. States he put her on a medication and it is helping but states she should be on a vitamin d3 supplement. She states she has been on vitamins before and some cause constipation. She is asking for advise on one she can take/try? Please advise.  Thank you

## 2021-06-07 RX ORDER — OMEPRAZOLE 20 MG/1
CAPSULE, DELAYED RELEASE ORAL
Qty: 180 CAPSULE | Refills: 3 | Status: SHIPPED | OUTPATIENT
Start: 2021-06-07 | End: 2022-06-02 | Stop reason: SDUPTHER

## 2021-08-13 ENCOUNTER — NURSE TRIAGE (OUTPATIENT)
Dept: OTHER | Facility: CLINIC | Age: 76
End: 2021-08-13

## 2021-08-13 NOTE — TELEPHONE ENCOUNTER
Patient was transferred to me in order to address her current symptoms listed per nurse triage. Patient states that he cardiologist recently changed her bp meds and thinks that maybe thata why she is have weakness and numbness in her feet. Per Bev patient was advised to reach out to cardiologist to address patients stand since he/she were the ones that made the rx change. Patient was upset that Bev would not see her today after I stated multiple time that she is not in the office and has no VV availability. I advised since she is unable to get with neil today that if the symptoms persist or get worse that she needs to go to [facility] or ER to be evaluated. Patient wanted to schedule a future appointment to discuss this with neil. I advised that I would be more than happy to schedule a f/u with neil but this issues needs to be addressed immediately. Patent was extremely upset and was questioning if neil is still her pcp. While trying to schedule a future f/u patient hung up the phone.

## 2021-08-13 NOTE — TELEPHONE ENCOUNTER
Reason for Disposition   Patient wants to be seen    Answer Assessment - Initial Assessment Questions  1. SYMPTOM: \"What is the main symptom you are concerned about? \" (e.g., weakness, numbness)      Numbness and tingling in the feet- bilateral feet - \"upper part of my feet\"   -\"not completely numb\"    2. ONSET: \"When did this start? \" (minutes, hours, days; while sleeping)      Pt is unsure if the symptoms are related to a new medication- Carvedilol 6.25 mg- symptoms started 2 weeks ago since using the medication per pt   Pt states- she spoke with PCP office this AM regarding this concern     3. LAST NORMAL: \"When was the last time you were normal (no symptoms)? \"      2 weeks ago      4. PATTERN \"Does this come and go, or has it been constant since it started? \"  \"Is it present now? \"      \"usually in the evenings when I take my shoes off\" - intermittent   - present at time of triage     5. CARDIAC SYMPTOMS: \"Have you had any of the following symptoms: chest pain, difficulty breathing, palpitations? \"      \"every now and then I have heart palpitations - a fluttering\"- chronic problem- pt has been seeing a cardiologist for over 1 year    6. NEUROLOGIC SYMPTOMS: \"Have you had any of the following symptoms: headache, dizziness, vision loss, double vision, changes in speech, unsteady on your feet? \"      Denies all symptoms     7. OTHER SYMPTOMS: \"Do you have any other symptoms? \"      \"Swelling in my ankles last week\" -not present at time of triage- \"but my feet feel tight\"     8. PREGNANCY: \"Is there any chance you are pregnant? \" \"When was your last menstrual period? \"      N/A    Protocols used: NEUROLOGIC DEFICIT-ADULT-[place]    Received call from Celine Echeverria  at [facility] with [employer].     Brief description of triage: pt is having numbness in both feet for the last 2 weeks - she is concerned it is related to a new medication (carvedilol) she states she spoke with her PCP office this morning and was told to stop taking the medication for 1 week. She is now concerned about her blood pressure and would like to just come in for an office visit today. Triage indicates for patient to See in office today. Advised pt to follow up in an [facility] or ED if no appointments available. Care advice provided, patient verbalizes understanding; denies any other questions or concerns; instructed to call back for any new or worsening symptoms. Writer provided warm transfer to [facility]  at [facility] for appointment scheduling. Attention Provider: Thank you for allowing me to participate in the care of your patient. The patient was connected to triage in response to information provided to the E[facility]. Please do not respond through this encounter as the response is not directed to a shared pool.

## 2021-08-27 ENCOUNTER — HOSPITAL ENCOUNTER (EMERGENCY)
Age: 76
Discharge: HOME | End: 2021-08-27
Attending: EMERGENCY MEDICINE
Payer: MEDICARE

## 2021-08-27 VITALS
HEIGHT: 66 IN | OXYGEN SATURATION: 94 % | TEMPERATURE: 98.5 F | RESPIRATION RATE: 15 BRPM | WEIGHT: 200 LBS | BODY MASS INDEX: 32.14 KG/M2 | SYSTOLIC BLOOD PRESSURE: 158 MMHG | DIASTOLIC BLOOD PRESSURE: 80 MMHG | HEART RATE: 83 BPM

## 2021-08-27 DIAGNOSIS — I10 HYPERTENSION, UNSPECIFIED TYPE: Primary | ICD-10-CM

## 2021-08-27 LAB
ABSOLUTE EOS #: 0.23 K/UL (ref 0–0.44)
ABSOLUTE IMMATURE GRANULOCYTE: 0.04 K/UL (ref 0–0.3)
ABSOLUTE LYMPH #: 1.5 K/UL (ref 1.1–3.7)
ABSOLUTE MONO #: 0.75 K/UL (ref 0.1–1.2)
ANION GAP SERPL CALCULATED.3IONS-SCNC: 13 MMOL/L (ref 9–17)
BASOPHILS # BLD: 1 % (ref 0–2)
BASOPHILS ABSOLUTE: 0.06 K/UL (ref 0–0.2)
BUN BLDV-MCNC: 14 MG/DL (ref 8–23)
BUN/CREAT BLD: 21 (ref 9–20)
CALCIUM SERPL-MCNC: 9.3 MG/DL (ref 8.6–10.4)
CHLORIDE BLD-SCNC: 97 MMOL/L (ref 98–107)
CO2: 24 MMOL/L (ref 20–31)
CREAT SERPL-MCNC: 0.67 MG/DL (ref 0.5–0.9)
DIFFERENTIAL TYPE: ABNORMAL
EOSINOPHILS RELATIVE PERCENT: 3 % (ref 1–4)
GFR AFRICAN AMERICAN: >60 ML/MIN
GFR NON-AFRICAN AMERICAN: >60 ML/MIN
GFR SERPL CREATININE-BSD FRML MDRD: ABNORMAL ML/MIN/{1.73_M2}
GFR SERPL CREATININE-BSD FRML MDRD: ABNORMAL ML/MIN/{1.73_M2}
GLUCOSE BLD-MCNC: 117 MG/DL (ref 70–99)
HCT VFR BLD CALC: 35.6 % (ref 36.3–47.1)
HEMOGLOBIN: 12.4 G/DL (ref 11.9–15.1)
IMMATURE GRANULOCYTES: 1 %
LYMPHOCYTES # BLD: 17 % (ref 24–43)
MCH RBC QN AUTO: 29.1 PG (ref 25.2–33.5)
MCHC RBC AUTO-ENTMCNC: 34.8 G/DL (ref 28.4–34.8)
MCV RBC AUTO: 83.6 FL (ref 82.6–102.9)
MONOCYTES # BLD: 9 % (ref 3–12)
NRBC AUTOMATED: 0 PER 100 WBC
PDW BLD-RTO: 12.4 % (ref 11.8–14.4)
PLATELET # BLD: 312 K/UL (ref 138–453)
PLATELET ESTIMATE: ABNORMAL
PMV BLD AUTO: 9.3 FL (ref 8.1–13.5)
POTASSIUM SERPL-SCNC: 3.8 MMOL/L (ref 3.7–5.3)
RBC # BLD: 4.26 M/UL (ref 3.95–5.11)
RBC # BLD: ABNORMAL 10*6/UL
SEG NEUTROPHILS: 69 % (ref 36–65)
SEGMENTED NEUTROPHILS ABSOLUTE COUNT: 6.07 K/UL (ref 1.5–8.1)
SODIUM BLD-SCNC: 134 MMOL/L (ref 135–144)
WBC # BLD: 8.7 K/UL (ref 3.5–11.3)
WBC # BLD: ABNORMAL 10*3/UL

## 2021-08-27 PROCEDURE — 96374 THER/PROPH/DIAG INJ IV PUSH: CPT

## 2021-08-27 PROCEDURE — 99283 EMERGENCY DEPT VISIT LOW MDM: CPT

## 2021-08-27 PROCEDURE — 6360000002 HC RX W HCPCS: Performed by: NURSE PRACTITIONER

## 2021-08-27 PROCEDURE — 93005 ELECTROCARDIOGRAM TRACING: CPT | Performed by: NURSE PRACTITIONER

## 2021-08-27 PROCEDURE — 80048 BASIC METABOLIC PNL TOTAL CA: CPT

## 2021-08-27 PROCEDURE — 85025 COMPLETE CBC W/AUTO DIFF WBC: CPT

## 2021-08-27 RX ORDER — HYDRALAZINE HYDROCHLORIDE 20 MG/ML
5 INJECTION INTRAMUSCULAR; INTRAVENOUS ONCE
Status: COMPLETED | OUTPATIENT
Start: 2021-08-27 | End: 2021-08-27

## 2021-08-27 RX ADMIN — HYDRALAZINE HYDROCHLORIDE 5 MG: 20 INJECTION INTRAMUSCULAR; INTRAVENOUS at 22:21

## 2021-08-27 ASSESSMENT — ENCOUNTER SYMPTOMS
ABDOMINAL PAIN: 0
NAUSEA: 0
SHORTNESS OF BREATH: 0
VOMITING: 0
PHOTOPHOBIA: 0
COUGH: 0
COLOR CHANGE: 0
EYE PAIN: 0
BACK PAIN: 0

## 2021-08-28 LAB
EKG ATRIAL RATE: 71 BPM
EKG P AXIS: 64 DEGREES
EKG P-R INTERVAL: 160 MS
EKG Q-T INTERVAL: 438 MS
EKG QRS DURATION: 88 MS
EKG QTC CALCULATION (BAZETT): 475 MS
EKG R AXIS: -17 DEGREES
EKG T AXIS: 25 DEGREES
EKG VENTRICULAR RATE: 71 BPM

## 2021-08-28 PROCEDURE — 93010 ELECTROCARDIOGRAM REPORT: CPT | Performed by: INTERNAL MEDICINE

## 2021-08-28 NOTE — ED PROVIDER NOTES
Team [address] ED  eMERGENCY dEPARTMENT eNCOUnter      Pt Name: Rory Mariano  MRN: [id number]  [place] 1945  Date of evaluation: 8/27/2021  Provider: JULIA Conti CNP    CHIEF COMPLAINT       Chief Complaint   Patient presents with    Hypertension         HISTORY OF PRESENT ILLNESS  (Location/Symptom, Timing/Onset, Context/Setting, Quality, Duration, Modifying Factors, Severity.)   Rory Mariano is a 67 y.o. female who presents to the emergency department via private auto for HTN. States she checked her BP this evening and it was elevated at around 180/100. She had tightness to her eyes. Denies fever, chills, chest pain, SOB, dizziness, HA, vision changes. Rates her pain 0/10 at this time. Her pcp has been adjusting her BP medications. She has been taking them as directed. Nursing Notes were reviewed. ALLERGIES     Codeine, Other, Cortisone, Flu virus vaccine, and Synthroid [levothyroxine sodium]    CURRENT MEDICATIONS       Previous Medications    ACETAMINOPHEN (TYLENOL) 500 MG TABLET    Take 500 mg by mouth every 6 hours as needed    CETIRIZINE (ZYRTEC) 10 MG TABLET        CLONIDINE (CATAPRES-TTS-3) 0.3 MG/24HR PTWK    Place 1 patch onto the skin once a week    COCONUT OIL OIL    Apply topically to scars    ELIQUIS 5 MG TABS TABLET        EMOLLIENT (EUCERIN EX)    Apply topically daily    FIBER, CORN DEXTRIN, PO    Take 3 g by mouth    FLUTICASONE PROPIONATE HFA IN    Inhale into the lungs    FUROSEMIDE (LASIX) 40 MG TABLET    Take 20 mg by mouth daily as needed    HYDRALAZINE (APRESOLINE) 50 MG TABLET    Take 50 mg by mouth 2 times daily    LACTOBACILLUS (MORE-DOPHILUS ACIDOPHILUS PO)    Take 1 tablet by mouth daily    LOSARTAN-HYDROCHLOROTHIAZIDE (HYZAAR) 100-25 MG PER TABLET    Take 1 tablet by mouth nightly     MAGNESIUM CHLORIDE 64 MG TABS    Take by mouth    MISC.  DEVICES MISC    Lateral  knee brace for the right knee    OMEPRAZOLE (PRILOSEC) 20 MG DELAYED RELEASE CAPSULE    TAKE 1 CAPSULE TWICE A DAY    POLYETHYLENE GLYCOL (GLYCOLAX) 17 G PACKET    Take 17 g by mouth daily as needed for Constipation    PRAMOXINE-HC (HYDROCORTISONE-PRAMOXINE) 2.5-1 % CREA CREAM    Apply topically 2 times daily       PAST MEDICAL HISTORY         Diagnosis Date    Atrial flutter (HCC)     with palpitations    Chronic constipation     whole life    Eczema     Frequent sinus infections     GERD (gastroesophageal reflux disease)     resolved    Hemorrhoid 10/26/2016    internal and external    Hemorrhoids 2017    Hypertension     Pain     Palpitations     Shingles     Tooth wear     FALSE TOOTH LOWER BOTTOM REMOVABLE PERMANENT DENTAL WORK    Wears glasses        SURGICAL HISTORY           Procedure Laterality Date    ANUS SURGERY      fistulotomy    APPENDECTOMY  1960    BREAST BIOPSY Left     COLONOSCOPY  10/26/2016    adenomatous polyps(tubular adenomas)    HYSTERECTOMY      FIBROIDS; one ovarian preservation    KNEE ARTHROSCOPY      LEFT    LUMBAR DISC SURGERY      LUMBAR FUSION  2013    Transforaminal Lumbar Interbody Fusion L4-5, S1    THYROIDECTOMY, PARTIAL      TONSILLECTOMY           FAMILY HISTORY           Problem Relation Age of Onset    Cancer Sister         pancrease    Heart Disease Mother     Heart Disease Father     Diabetes Brother     No Known Problems Maternal Grandmother     No Known Problems Maternal Grandfather     No Known Problems Paternal Grandmother     No Known Problems Paternal Grandfather      Family Status   Relation Name Status    Sister  (Not Specified)    Mother      Father      Brother Lawerence (Not Specified)    MGM  (Not Specified)    MGF  (Not Specified)    PGM  (Not Specified)    PGF  (Not Specified)        SOCIAL HISTORY      reports that she quit smoking about 44 years ago. Her smoking use included cigarettes. She has a 1.25 pack-year smoking history.  She has never used smokeless tobacco. She reports current alcohol use of about 1.0 standard drinks of alcohol per week. She reports that she does not use drugs. REVIEW OF SYSTEMS    (2-9 systems for level 4, 10 or more for level 5)     Review of Systems   Constitutional: Negative for chills, diaphoresis, fatigue and fever. Eyes: Negative for photophobia, pain and visual disturbance. Respiratory: Negative for cough and shortness of breath. Cardiovascular: Negative for chest pain, palpitations and leg swelling. Gastrointestinal: Negative for abdominal pain, nausea and vomiting. Musculoskeletal: Negative for back pain, myalgias and neck pain. Skin: Negative for color change, rash and wound. Neurological: Negative for dizziness, syncope, facial asymmetry, speech difficulty, weakness, light-headedness, numbness and headaches. Psychiatric/Behavioral: Negative for confusion. Except as noted above the remainder of the review of systems was reviewed and negative. PHYSICAL EXAM    (up to 7 for level 4, 8 or more for level 5)     ED Triage Vitals [08/27/21 1936]   BP Temp Temp Source Pulse Resp SpO2 Height Weight   (!) 175/101 98.5 °F (36.9 °C) Oral 81 18 99 % 5' 6\" (1.676 m) 200 lb (90.7 kg)     Physical Exam  Vitals reviewed. Constitutional:       General: She is not in acute distress. Appearance: She is well-developed. She is not diaphoretic. Eyes:      General: No scleral icterus. Extraocular Movements: Extraocular movements intact. Conjunctiva/sclera: Conjunctivae normal.      Pupils: Pupils are equal, round, and reactive to light. Cardiovascular:      Rate and Rhythm: Normal rate and regular rhythm. Heart sounds: Normal heart sounds. Pulmonary:      Effort: Pulmonary effort is normal. No respiratory distress. Breath sounds: Normal breath sounds. No wheezing or rales. Abdominal:      Palpations: Abdomen is soft. Tenderness: There is no abdominal tenderness.    Musculoskeletal: Cervical back: Neck supple. Comments: Moves extremities. Skin:     General: Skin is warm and dry. Findings: No rash. Neurological:      Mental Status: She is alert and oriented to person, place, and time. GCS: GCS eye subscore is 4. GCS verbal subscore is 5. GCS motor subscore is 6. Motor: Motor function is intact. Coordination: Coordination is intact. Psychiatric:         Behavior: Behavior normal.         DIAGNOSTIC RESULTS     EKG: All EKG's are interpreted by the Emergency Department Physician who either signs or Co-signs this chart in the absence of a cardiologist.  EKG was interpreted by Dr. Yelena Eden after completion. LABS:  Labs Reviewed   BASIC METABOLIC PANEL - Abnormal; Notable for the following components:       Result Value    Glucose 117 (*)     Bun/Cre Ratio 21 (*)     Sodium 134 (*)     Chloride 97 (*)     All other components within normal limits   CBC WITH AUTO DIFFERENTIAL - Abnormal; Notable for the following components:    Hematocrit 35.6 (*)     Seg Neutrophils 69 (*)     Lymphocytes 17 (*)     Immature Granulocytes 1 (*)     All other components within normal limits       All other labs were within normal range or not returned as of this dictation. EMERGENCY DEPARTMENT COURSE and DIFFERENTIAL DIAGNOSIS/MDM:   Vitals:    Vitals:    08/27/21 2230 08/27/21 2245 08/27/21 2300 08/27/21 2305   BP: (!) 166/83 (!) 166/82 (!) 158/80 (!) 158/80   Pulse: 72 75 83    Resp: 14 17 15    Temp:       TempSrc:       SpO2: 94% 92% 94%    Weight:       Height:             MEDICATIONS GIVEN IN THE ED:  Medications   hydrALAZINE (APRESOLINE) injection 5 mg (5 mg IntraVENous Given 8/27/21 2221)       CLINICAL DECISION MAKING:  The patient presented alert with a nontoxic appearance and was seen in conjunction with Dr. Yelena Eden. She was given medication here for her BP with improvement. Follow up with pcp for a recheck, further evaluation.  Evaluation and treatment course in the ED, and plan of care upon discharge was discussed in length with the patient. Patient had no further questions prior to being discharged and was instructed to return to the ED for new or worsening symptoms. CRITICAL CARE TIME     Due to the high probability of sudden and clinically significant deterioration in the patient's condition she required highest level of my preparedness to intervene urgently. I provided critical care time including documentation time, medication orders and management, reevaluation, vital sign assessment, ordering and reviewing of of lab tests ordering and reviewing of x-ray studies, and admission orders. Aggregate critical care time is 30 minutes including only time during which I was engaged in work directly related to her care and did not include time spent treating other patients simultaneously. FINAL IMPRESSION      1.  Hypertension, unspecified type            Problem List  Patient Active Problem List   Diagnosis Code    Spondylolisthesis M43.10    Spinal stenosis of lumbar region M48.061    Essential hypertension I10    Gastroesophageal reflux disease K21.9    Arm heaviness R29.898    Bradycardia R00.1    Atypical chest pain R07.89    Rectal pain K62.89    Slow transit constipation K59.01    Hemorrhoids K64.9    Herpes zoster with complication J12.4    Presbyopia H52.4    Posterior vitreous detachment of right eye H43.811    Peripheral retinal degeneration, paving stone, bilateral H35.433    Hyperopia H52.00    Mixed hyperlipidemia E78.2    Macular drusen, bilateral H35.363    Acute pain of right shoulder M25.511    Anal fissure K60.2    Class 1 obesity due to excess calories without serious comorbidity with body mass index (BMI) of 31.0 to 31.9 in adult E66.09, Z68.31    Floater, vitreous H43.399    Primary osteoarthritis of right knee M17.11         DISPOSITION/PLAN   DISPOSITION Decision To Discharge 08/27/2021 10:55:56 PM      PATIENT REFERRED TO: Wing Montoya, JULIA - CNP  [address]  [place] [address] [phone]    Schedule an appointment as soon as possible for a visit       [facility] ED  [address] [facility]  [phone]    If symptoms worsen, As needed      DISCHARGE MEDICATIONS:     New Prescriptions    No medications on file           (Please note that portions of this note were completed with a voice recognition program.  Efforts were made to edit the dictations but occasionally words are mis-transcribed.)    JULIA Gifford - JULIA Davis CNP  08/27/21 3431

## 2021-11-01 ENCOUNTER — TELEPHONE (OUTPATIENT)
Dept: FAMILY MEDICINE CLINIC | Age: 76
End: 2021-11-01

## 2021-11-01 DIAGNOSIS — Z12.31 ENCOUNTER FOR SCREENING MAMMOGRAM FOR MALIGNANT NEOPLASM OF BREAST: Primary | ICD-10-CM

## 2021-12-05 RX ORDER — POLYETHYLENE GLYCOL 3350 17 G/17G
POWDER, FOR SOLUTION ORAL
Qty: 90 PACKET | Refills: 1 | Status: SHIPPED | OUTPATIENT
Start: 2021-12-05

## 2022-01-04 DIAGNOSIS — Z12.31 ENCOUNTER FOR SCREENING MAMMOGRAM FOR MALIGNANT NEOPLASM OF BREAST: ICD-10-CM

## 2022-02-14 ENCOUNTER — TELEPHONE (OUTPATIENT)
Dept: FAMILY MEDICINE CLINIC | Age: 77
End: 2022-02-14

## 2022-02-15 ENCOUNTER — VIRTUAL VISIT (OUTPATIENT)
Dept: FAMILY MEDICINE CLINIC | Age: 77
End: 2022-02-15
Payer: MEDICARE

## 2022-02-15 DIAGNOSIS — Z00.00 MEDICARE ANNUAL WELLNESS VISIT, SUBSEQUENT: Primary | ICD-10-CM

## 2022-02-15 DIAGNOSIS — Z00.00 ROUTINE GENERAL MEDICAL EXAMINATION AT A HEALTH CARE FACILITY: ICD-10-CM

## 2022-02-15 PROCEDURE — G0439 PPPS, SUBSEQ VISIT: HCPCS | Performed by: NURSE PRACTITIONER

## 2022-02-15 PROCEDURE — 1123F ACP DISCUSS/DSCN MKR DOCD: CPT | Performed by: NURSE PRACTITIONER

## 2022-02-15 PROCEDURE — G8484 FLU IMMUNIZE NO ADMIN: HCPCS | Performed by: NURSE PRACTITIONER

## 2022-02-15 PROCEDURE — 4040F PNEUMOC VAC/ADMIN/RCVD: CPT | Performed by: NURSE PRACTITIONER

## 2022-02-15 RX ORDER — ACETAMINOPHEN 500 MG
500 TABLET ORAL EVERY 6 HOURS PRN
COMMUNITY

## 2022-02-15 ASSESSMENT — PATIENT HEALTH QUESTIONNAIRE - PHQ9
SUM OF ALL RESPONSES TO PHQ QUESTIONS 1-9: 0
SUM OF ALL RESPONSES TO PHQ QUESTIONS 1-9: 0
SUM OF ALL RESPONSES TO PHQ9 QUESTIONS 1 & 2: 0
2. FEELING DOWN, DEPRESSED OR HOPELESS: 0
1. LITTLE INTEREST OR PLEASURE IN DOING THINGS: 0
SUM OF ALL RESPONSES TO PHQ QUESTIONS 1-9: 0
SUM OF ALL RESPONSES TO PHQ QUESTIONS 1-9: 0

## 2022-02-15 ASSESSMENT — LIFESTYLE VARIABLES: HOW OFTEN DO YOU HAVE A DRINK CONTAINING ALCOHOL: 0

## 2022-02-15 NOTE — PROGRESS NOTES
Medicare Annual Wellness Visit  Are Name: Li Muñiz Date: 2/15/2022   MRN: [id number] Sex: Female   Age: 67 y.o. Ethnicity: Non- / Raciel Devonte   : 1945 Race: Fergus Weld / African American      [employer] is here for Bartolome Key ALIEV    Screenings for behavioral, psychosocial and functional/safety risks, and cognitive dysfunction are all negative except as indicated below. These results, as well as other patient data from the [address] form, are documented in Flowsheets linked to this Encounter. Allergies   Allergen Reactions    Codeine      Feels like will pass out    Other Other (See Comments)     Red dyes dizziness     Cortisone     Influenza Virus Vaccine Nausea And Vomiting    Synthroid [Levothyroxine Sodium] Nausea And Vomiting       Prior to Visit Medications    Medication Sig Taking?  Authorizing Provider   acetaminophen (TYLENOL) 500 MG tablet Take 500 mg by mouth every 6 hours as needed for Pain Tylenol Arthritis Yes Historical Provider, MD   polyethylene glycol (MIRALAX) 17 g PACK packet DISSOLVE 17 GRAMS IN 8 OUNCES OF LIQUID AND DRINK ONCE A DAY Yes JULIA Magdaleno CNP   FLUTICASONE PROPIONATE HFA IN Inhale into the lungs Yes Historical Provider, MD   omeprazole (PRILOSEC) 20 MG delayed release capsule TAKE 1 CAPSULE TWICE A DAY Yes JULIA Magdaleno CNP   hydrALAZINE (APRESOLINE) 50 MG tablet Take 50 mg by mouth 2 times daily Yes Historical Provider, MD   cetirizine (ZYRTEC) 10 MG tablet  Yes Historical Provider, MD   ELIQUIS 5 MG TABS tablet  Yes Historical Provider, MD   Pramoxine-HC (HYDROCORTISONE-PRAMOXINE) 2.5-1 % CREA cream Apply topically 2 times daily Yes Historical Provider, MD   cloNIDine (CATAPRES-TTS-3) 0.3 MG/24HR PTWK Place 1 patch onto the skin once a week Yes Kaela Mata MD   Emollient (EUCERIN EX) Apply topically daily Yes Historical Provider, MD   Coconut Oil OIL Apply topically to scars Yes Historical Provider, MD losartan-hydrochlorothiazide (HYZAAR) 100-25 MG per tablet Take 1 tablet by mouth nightly  Yes Historical Provider, MD   furosemide (LASIX) 40 MG tablet Take 20 mg by mouth daily as needed  Patient not taking: Reported on 2/15/2022  Historical Provider, MD   Magnesium Chloride 64 MG TABS Take by mouth  Patient not taking: Reported on 2/15/2022  Historical Provider, MD   Lactobacillus (MORE-DOPHILUS ACIDOPHILUS PO) Take 1 tablet by mouth daily  Historical Provider, MD   Misc.  Devices MISC Lateral  knee brace for the right knee  Glory [place], DO       Past Medical History:   Diagnosis Date    Atrial flutter (HCC)     with palpitations    Chronic constipation     whole life    Eczema     Frequent sinus infections     GERD (gastroesophageal reflux disease)     resolved    Hemorrhoid 10/26/2016    internal and external    Hemorrhoids 8/22/2017    Hypertension     Pain     Palpitations     Shingles     Tooth wear     FALSE TOOTH LOWER BOTTOM REMOVABLE PERMANENT DENTAL WORK    Wears glasses        Past Surgical History:   Procedure Laterality Date    ANUS SURGERY      fistulotomy    APPENDECTOMY  1960    BREAST BIOPSY Left 1988    COLONOSCOPY  10/26/2016    adenomatous polyps(tubular adenomas)    HYSTERECTOMY      FIBROIDS; one ovarian preservation    KNEE ARTHROSCOPY      LEFT    LUMBAR DISC SURGERY      LUMBAR FUSION  12/30/2013    Transforaminal Lumbar Interbody Fusion L4-5, S1    THYROIDECTOMY, PARTIAL      TONSILLECTOMY         Family History   Problem Relation Age of Onset    Cancer Sister         pancrease    Heart Disease Mother     Heart Disease Father     Diabetes Brother     No Known Problems Maternal Grandmother     No Known Problems Maternal Grandfather     No Known Problems Paternal Grandmother     No Known Problems Paternal Grandfather        CareTeam (Including outside providers/suppliers regularly involved in providing care):   Patient Care Team:  Henna Mason Brenton Nur - CNP as PCP - General (Nurse Practitioner)  JULIA Keane C[facility]    Wt Readings from Last 3 Encounters:   08/27/21 200 lb (90.7 kg)   03/24/21 196 lb 9.6 oz (89.2 kg)   01/18/21 200 lb 3.2 oz (90.8 kg)      Patient-Reported Vitals 2/15/2022   Patient-Reported Weight 200   Patient-Reported Height 5'6      There is no height or weight on file to calculate BMI. Based upon direct observation of the patient, evaluation of cognition reveals recent and remote memory intact. Patient's complete Health Risk Assessment and screening values have been reviewed and are found in Flowsheets. The following problems were reviewed today and where indicated follow up appointments were made and/or referrals ordered. Positive Risk Factor Screenings with Interventions:            General Health and ACP:  General  In general, how would you say your health is?: Good  In the past 7 days, have you experienced any of the following?  New or Increased Pain, New or Increased Fatigue, Loneliness, Social Isolation, Stress or Anger?: None of These  Do you get the social and emotional support that you need?: Yes  Do you have a Living Will?: Yes  Advance Directives     Power of [address] [place] [address] Will ACP-Advance Directive ACP-Power of     Not on File Not on File Not on File Not on File      General Health Risk Interventions:  · No Living Will: ACP documents already completed- patient asked to provide copy to the office    Health Habits/Nutrition:  Health Habits/Nutrition  Do you exercise for at least 20 minutes 2-3 times per week?: Yes  Have you lost any weight without trying in the past 3 months?: No  Do you eat only one meal per day?: No  Have you seen the dentist within the past year?: Yes     Health Habits/Nutrition Interventions:  · NA    Hearing/Vision:  No exam data present  Hearing/Vision  Do you or your family notice any trouble with your hearing that hasn't been managed with hearing aids?: No  Do you have difficulty driving, watching TV, or doing any of your daily activities because of your eyesight?: No  Have you had an eye exam within the past year?: (!) No (will schedule)  Hearing/Vision Interventions:  · Vision concerns:  patient encouraged to make appointment with his/her eye specialist    Safety:  Safety  Do you have working smoke detectors?: Yes  Have all throw rugs been removed or fastened?: Yes  Do you have non-slip mats or surfaces in all bathtubs/showers?: Yes (shower chair also)  Do all of your stairways have a railing or banister?: (!) No (NA)  Are your doorways, halls and stairs free of clutter?: Yes  Do you always fasten your seatbelt when you are in a car?: Yes  Safety Interventions:  · Home safety tips provided       Personalized Preventive Plan   Current Health Maintenance Status  Immunization History   Administered Date(s) Administered    COVID-19, Mitch Muse, Primary or Immunocompromised, PF, 100mcg/0.5mL 03/08/2021    Pneumococcal Conjugate 13-valent ([id number]) 10/02/2018    Pneumococcal Polysaccharide ([id number]) 10/22/2019        Health Maintenance   Topic Date Due    Shingles Vaccine (1 of 2) Never done    COVID-19 Vaccine (2 - Moderna 3-dose series) 04/05/2021    Annual Wellness Visit (AWV)  01/19/2022    DTaP/Tdap/Td vaccine (1 - Tdap) 02/15/2023 (Originally 3/27/1964)    Potassium monitoring  08/27/2022    Creatinine monitoring  08/27/2022    Depression Screen  02/15/2023    DEXA (modify frequency per FRAX score)  Completed    Pneumococcal 65+ years Vaccine  Completed    Hepatitis C screen  Completed    Hepatitis A vaccine  Aged Out    Hepatitis B vaccine  Aged Out    Hib vaccine  Aged Out    Meningococcal (ACWY) vaccine  Aged Out     Recommendations for [employer] Due: see orders and patient instructions/AVS.  .   Recommended screening schedule for the next 5-10 years is provided to the patient in written form: see Patient Instructions/AVS.    Rajendra Carney was seen today for medicare awv. Diagnoses and all orders for this visit:    Routine general medical examination at a health care facility         pt doing very well  Has new puppy to play with  Labs uTD and also other HM  Patient advised to follow heart healthy, low fat  diet and 150 minutes of cardiovascular exercise per week   Continue same meds       Shala Tierney is a 67 y.o. female being evaluated by a Virtual Visit (phone) encounter to address concerns as mentioned above. A caregiver was present when appropriate. Due to this being a TeleHealth encounter (During DPZXH-76 public health emergency), evaluation of the following organ systems was limited: Vitals/Constitutional/EENT/Resp/CV/GI//MS/Neuro/Skin/Heme-Lymph-Imm. Pursuant to the emergency declaration under the [address] [place] [address], [address] [place] [address] authority and the [employer] Act, this Virtual Visit was conducted with patient's (and/or legal guardian's) consent, to reduce the patient's risk of exposure to COVID-19 and provide necessary medical care. The patient (and/or legal guardian) has also been advised to contact this office for worsening conditions or problems, and seek emergency medical treatment and/or call 911 if deemed necessary. Patient identification was verified at the start of the visit: Yes    Services were provided through phone to substitute for in-person clinic visit. Patient and provider were located at their individual homes. --JULIA Tadeo - CNP on 2/15/2022 at 12:36 PM    An electronic signature was used to authenticate this note.

## 2022-02-15 NOTE — PATIENT INSTRUCTIONS
Personalized Preventive Plan for Ramos Ernst - 2/15/2022  Medicare offers a range of preventive health benefits. Some of the tests and screenings are paid in full while other may be subject to a deductible, co-insurance, and/or copay. Some of these benefits include a comprehensive review of your medical history including lifestyle, illnesses that may run in your family, and various assessments and screenings as appropriate. After reviewing your medical record and screening and assessments performed today your provider may have ordered immunizations, labs, imaging, and/or referrals for you. A list of these orders (if applicable) as well as your Preventive Care list are included within your After Visit Summary for your review. Other Preventive Recommendations:    · A preventive eye exam performed by an eye specialist is recommended every 1-2 years to screen for glaucoma; cataracts, macular degeneration, and other eye disorders. · A preventive dental visit is recommended every 6 months. · Try to get at least 150 minutes of exercise per week or 10,000 steps per day on a pedometer . · Order or download the FREE \"Exercise & Physical Activity: Your Everyday Guide\" from [employer]. Call 9-478.988.8058 or search [employer] online. · You need 4735-7866 mg of calcium and 5113-2855 IU of vitamin D per day. It is possible to meet your calcium requirement with diet alone, but a vitamin D supplement is usually necessary to meet this goal.  · When exposed to the sun, use a sunscreen that protects against both UVA and UVB radiation with an SPF of 30 or greater. Reapply every 2 to 3 hours or after sweating, drying off with a towel, or swimming. · Always wear a seat belt when traveling in a car. Always wear a helmet when riding a bicycle or motorcycle.

## 2022-02-15 NOTE — PROGRESS NOTES
Visit Information    Have you changed or started any medications since your last visit including any over-the-counter medicines, vitamins, or herbal medicines? no   Have you stopped taking any of your medications? Is so, why? -  no  Are you having any side effects from any of your medications? - no    Have you seen any other physician or provider since your last visit?  no   Have you had any other diagnostic tests since your last visit?  no   Have you been seen in the emergency room and/or had an admission in a hospital since we last saw you?  no   Have you had your routine dental cleaning in the past 6 months?  no     Do you have an active MyChart account? If no, what is the barrier?   no    Patient Care Team:  JULIA Pabon CNP as PCP - General (Nurse Practitioner)  JULIA Pabon CNP as PCP - [facility] Provider    Medical History Review  Past Medical, Family, and Social History reviewed and  contribute to the patient presenting condition    Health Maintenance   Topic Date Due    Depression Screen  Never done    DTaP/Tdap/Td vaccine (1 - Tdap) Never done    Shingles Vaccine (1 of 2) Never done    COVID-19 Vaccine (2 - Dicie Sorrel 3-dose series) 04/05/2021    Annual Wellness Visit (AWV)  01/19/2022    Potassium monitoring  08/27/2022    Creatinine monitoring  08/27/2022    DEXA (modify frequency per FRAX score)  Completed    Pneumococcal 65+ years Vaccine  Completed    Hepatitis C screen  Completed    Hepatitis A vaccine  Aged Out    Hepatitis B vaccine  Aged Out    Hib vaccine  Aged Out    Meningococcal (ACWY) vaccine  Aged Out

## 2022-06-02 RX ORDER — OMEPRAZOLE 20 MG/1
CAPSULE, DELAYED RELEASE ORAL
Qty: 180 CAPSULE | Refills: 0 | Status: SHIPPED | OUTPATIENT
Start: 2022-06-02 | End: 2022-09-01

## 2022-09-01 RX ORDER — OMEPRAZOLE 20 MG/1
CAPSULE, DELAYED RELEASE ORAL
Qty: 180 CAPSULE | Refills: 0 | Status: SHIPPED | OUTPATIENT
Start: 2022-09-01 | End: 2022-10-19

## 2022-10-19 RX ORDER — OMEPRAZOLE 20 MG/1
CAPSULE, DELAYED RELEASE ORAL
Qty: 180 CAPSULE | Refills: 1 | Status: SHIPPED | OUTPATIENT
Start: 2022-10-19

## 2022-11-21 ENCOUNTER — TELEPHONE (OUTPATIENT)
Dept: FAMILY MEDICINE CLINIC | Age: 77
End: 2022-11-21

## 2022-11-21 DIAGNOSIS — Z12.31 ENCOUNTER FOR SCREENING MAMMOGRAM FOR MALIGNANT NEOPLASM OF BREAST: Primary | ICD-10-CM

## 2022-11-21 NOTE — TELEPHONE ENCOUNTER
Pt called and would like to know if you can place a order for a mammogram. Pt would like to go to [facility].

## 2023-01-03 ENCOUNTER — TELEPHONE (OUTPATIENT)
Dept: FAMILY MEDICINE CLINIC | Age: 78
End: 2023-01-03

## 2023-01-03 NOTE — TELEPHONE ENCOUNTER
Call center scheduled for physical too soon. Patient not due until 2/16/2023 or later. Patient appt for discussing forms made for 1/10.

## 2023-01-03 NOTE — TELEPHONE ENCOUNTER
----- Message from Patrica Leon sent at 1/3/2023  8:29 AM EST -----  Subject: Message to Provider    QUESTIONS  Information for Provider? Made Patient an appointment but it is not until   end of month. Patient does need forms filled out before that date, also if   any appts become available before the 26th she would like call to come in   sooner. ---------------------------------------------------------------------------  --------------  Warden Benitez YSPX  [phone]; OK to leave message on voicemail  ---------------------------------------------------------------------------  --------------  SCRIPT ANSWERS  Relationship to Patient?  Self

## 2023-01-10 ENCOUNTER — OFFICE VISIT (OUTPATIENT)
Dept: FAMILY MEDICINE CLINIC | Age: 78
End: 2023-01-10
Payer: MEDICARE

## 2023-01-10 VITALS
WEIGHT: 200 LBS | TEMPERATURE: 98.7 F | HEIGHT: 66 IN | SYSTOLIC BLOOD PRESSURE: 128 MMHG | BODY MASS INDEX: 32.14 KG/M2 | DIASTOLIC BLOOD PRESSURE: 72 MMHG | OXYGEN SATURATION: 98 % | HEART RATE: 60 BPM

## 2023-01-10 DIAGNOSIS — I10 ESSENTIAL HYPERTENSION: ICD-10-CM

## 2023-01-10 DIAGNOSIS — E78.2 MIXED HYPERLIPIDEMIA: Primary | ICD-10-CM

## 2023-01-10 DIAGNOSIS — L81.2 FRECKLES: ICD-10-CM

## 2023-01-10 DIAGNOSIS — Z86.39 HISTORY OF THYROID DISEASE: ICD-10-CM

## 2023-01-10 DIAGNOSIS — R68.89 COLD INTOLERANCE: ICD-10-CM

## 2023-01-10 PROCEDURE — 1036F TOBACCO NON-USER: CPT | Performed by: NURSE PRACTITIONER

## 2023-01-10 PROCEDURE — 1090F PRES/ABSN URINE INCON ASSESS: CPT | Performed by: NURSE PRACTITIONER

## 2023-01-10 PROCEDURE — G8427 DOCREV CUR MEDS BY ELIG CLIN: HCPCS | Performed by: NURSE PRACTITIONER

## 2023-01-10 PROCEDURE — G8484 FLU IMMUNIZE NO ADMIN: HCPCS | Performed by: NURSE PRACTITIONER

## 2023-01-10 PROCEDURE — 1123F ACP DISCUSS/DSCN MKR DOCD: CPT | Performed by: NURSE PRACTITIONER

## 2023-01-10 PROCEDURE — 99213 OFFICE O/P EST LOW 20 MIN: CPT | Performed by: NURSE PRACTITIONER

## 2023-01-10 PROCEDURE — 3078F DIAST BP <80 MM HG: CPT | Performed by: NURSE PRACTITIONER

## 2023-01-10 PROCEDURE — 3074F SYST BP LT 130 MM HG: CPT | Performed by: NURSE PRACTITIONER

## 2023-01-10 PROCEDURE — G8417 CALC BMI ABV UP PARAM F/U: HCPCS | Performed by: NURSE PRACTITIONER

## 2023-01-10 PROCEDURE — G8399 PT W/DXA RESULTS DOCUMENT: HCPCS | Performed by: NURSE PRACTITIONER

## 2023-01-10 RX ORDER — TACROLIMUS 1 MG/G
OINTMENT TOPICAL 2 TIMES DAILY
COMMUNITY

## 2023-01-10 RX ORDER — HYDROXYZINE HYDROCHLORIDE 25 MG/1
25 TABLET, FILM COATED ORAL 3 TIMES DAILY PRN
COMMUNITY

## 2023-01-10 SDOH — ECONOMIC STABILITY: FOOD INSECURITY: WITHIN THE PAST 12 MONTHS, THE FOOD YOU BOUGHT JUST DIDN'T LAST AND YOU DIDN'T HAVE MONEY TO GET MORE.: NEVER TRUE

## 2023-01-10 SDOH — ECONOMIC STABILITY: FOOD INSECURITY: WITHIN THE PAST 12 MONTHS, YOU WORRIED THAT YOUR FOOD WOULD RUN OUT BEFORE YOU GOT MONEY TO BUY MORE.: NEVER TRUE

## 2023-01-10 ASSESSMENT — ENCOUNTER SYMPTOMS
NAUSEA: 0
CHEST TIGHTNESS: 0
COUGH: 0
SHORTNESS OF BREATH: 0
ABDOMINAL PAIN: 0
VOMITING: 0

## 2023-01-10 ASSESSMENT — PATIENT HEALTH QUESTIONNAIRE - PHQ9
SUM OF ALL RESPONSES TO PHQ QUESTIONS 1-9: 0
2. FEELING DOWN, DEPRESSED OR HOPELESS: 0
SUM OF ALL RESPONSES TO PHQ QUESTIONS 1-9: 0
SUM OF ALL RESPONSES TO PHQ9 QUESTIONS 1 & 2: 0
1. LITTLE INTEREST OR PLEASURE IN DOING THINGS: 0

## 2023-01-10 ASSESSMENT — SOCIAL DETERMINANTS OF HEALTH (SDOH): HOW HARD IS IT FOR YOU TO PAY FOR THE VERY BASICS LIKE FOOD, HOUSING, MEDICAL CARE, AND HEATING?: NOT HARD AT ALL

## 2023-01-10 NOTE — PROGRESS NOTES
[address]  [place], [address]  [phone]      Paula Wood is a 65 y.o. female who presents today for her  medicalconditions/complaints as noted below. Paula Wood is c/o of Hyperlipidemia (Cold intolerance, req routine labs and whatever else may be needed. Questions on MV)  . HPI:    Hyperlipidemia  Pertinent negatives include no chest pain or shortness of breath. Pt here today for labs orders to be evaluated for cold intolerance. States it takes her a long time to warm up after being outside and always cold when outside walking despite multiple layers. States BP has been well controlled on current meds.     Past Medical History:   Diagnosis Date    Atrial flutter ([address]     with palpitations    Chronic constipation     whole life    Eczema     Frequent sinus infections     GERD (gastroesophageal reflux disease)     resolved    Hemorrhoid 10/26/2016    internal and external    Hemorrhoids 8/22/2017    Hypertension     Pain     Palpitations     Shingles     Tooth wear     FALSE TOOTH LOWER BOTTOM REMOVABLE PERMANENT DENTAL WORK    Wears glasses       Past Surgical History:   Procedure Laterality Date    ANUS SURGERY      fistulotomy    APPENDECTOMY  1960    BREAST BIOPSY Left 1988    COLONOSCOPY  10/26/2016    adenomatous polyps(tubular adenomas)    HYSTERECTOMY (CERVIX STATUS UNKNOWN)      FIBROIDS; one ovarian preservation    KNEE ARTHROSCOPY      LEFT    LUMBAR DISC SURGERY      LUMBAR FUSION  12/30/2013    Transforaminal Lumbar Interbody Fusion L4-5, S1    THYROIDECTOMY, PARTIAL      TONSILLECTOMY       Family History   Problem Relation Age of Onset    Cancer Sister         pancrease    Heart Disease Mother     Heart Disease Father     Diabetes Brother     No Known Problems Maternal Grandmother     No Known Problems Maternal Grandfather     No Known Problems Paternal Grandmother     No Known Problems Paternal Grandfather      Social History Tobacco Use    Smoking status: Former     Packs/day: 0.25     Years: 5.00     Pack years: 1.25     Types: Cigarettes     Quit date: 1977     Years since quittin.4    Smokeless tobacco: Never    Tobacco comments:     QUIT SMOKING YRS AGO    Substance Use Topics    Alcohol use: Yes     Alcohol/week: 1.0 standard drink     Types: 1 Glasses of wine per week     Comment: OCC WINE      Current Outpatient Medications   Medication Sig Dispense Refill    hydrOXYzine HCl (ATARAX) 25 MG tablet Take 25 mg by mouth 3 times daily as needed for Itching      tacrolimus (PROTOPIC) 0.1 % ointment Apply topically 2 times daily Apply topically 2 times daily. hydrALAZINE (APRESOLINE) 50 MG tablet Take 50 mg by mouth 2 times daily      ELIQUIS 5 MG TABS tablet       cloNIDine (CATAPRES-TTS-3) 0.3 MG/24HR PTWK Place 1 patch onto the skin once a week 4 patch 0    losartan-hydrochlorothiazide (HYZAAR) 100-25 MG per tablet Take 1 tablet by mouth nightly       omeprazole (PRILOSEC) 20 MG delayed release capsule TAKE 1 CAPSULE BY MOUTH TWICE A  capsule 1    acetaminophen (TYLENOL) 500 MG tablet Take 500 mg by mouth every 6 hours as needed for Pain Tylenol Arthritis      polyethylene glycol (MIRALAX) 17 g PACK packet DISSOLVE 17 GRAMS IN 8 OUNCES OF LIQUID AND DRINK ONCE A DAY 90 packet 1    FLUTICASONE PROPIONATE HFA IN Inhale into the lungs      furosemide (LASIX) 40 MG tablet Take 20 mg by mouth daily as needed      cetirizine (ZYRTEC) 10 MG tablet       Pramoxine-HC (HYDROCORTISONE-PRAMOXINE) 2.5-1 % CREA cream Apply topically 2 times daily      Magnesium Chloride 64 MG TABS Take by mouth      Lactobacillus (MORE-DOPHILUS ACIDOPHILUS PO) Take 1 tablet by mouth daily      Emollient (EUCERIN EX) Apply topically daily      Coconut Oil OIL Apply topically to scars      Misc. Devices MISC Lateral  knee brace for the right knee 1 Device 0     No current facility-administered medications for this visit. Allergies   Allergen Reactions    Codeine      Feels like will pass out    Other Other (See Comments)     Red dyes dizziness     Cortisone     Influenza Virus Vaccine Nausea And Vomiting    Synthroid [Levothyroxine Sodium] Nausea And Vomiting       Health Maintenance   Topic Date Due    Shingles vaccine (1 of 2) Never done    COVID-19 Vaccine (5 - Booster for Moderna series) 03/01/2022    DTaP/Tdap/Td vaccine (1 - Tdap) 02/15/2023 (Originally 3/27/1964)    Depression Screen  02/15/2023    Annual Wellness Visit (AWV)  02/16/2023    DEXA (modify frequency per FRAX score)  Completed    Pneumococcal 65+ years Vaccine  Completed    Hepatitis C screen  Completed    Hepatitis A vaccine  Aged Out    Hib vaccine  Aged Out    Meningococcal (ACWY) vaccine  Aged Out       Subjective:      Review of Systems   Constitutional:  Negative for appetite change, chills, diaphoresis, fatigue and fever. Eyes:  Negative for visual disturbance. Respiratory:  Negative for cough, chest tightness and shortness of breath. Cardiovascular:  Negative for chest pain, palpitations and leg swelling. Gastrointestinal:  Negative for abdominal pain, nausea and vomiting. Endocrine: Positive for cold intolerance. Negative for heat intolerance. Genitourinary:  Negative for difficulty urinating. Skin:  Negative for rash. Neurological:  Negative for dizziness, light-headedness, numbness and headaches. Hematological:  Negative for adenopathy. Psychiatric/Behavioral:  Negative for sleep disturbance. Objective:      Physical Exam  Vitals and nursing note reviewed. Constitutional:       General: She is not in acute distress. Appearance: Normal appearance. She is well-developed. She is obese. She is not ill-appearing or diaphoretic. HENT:      Head: Normocephalic and atraumatic. Eyes:      Conjunctiva/sclera: Conjunctivae normal.   Cardiovascular:      Rate and Rhythm: Normal rate and regular rhythm.       Pulses: Normal pulses. Heart sounds: Normal heart sounds. No murmur heard. Comments: No LE edema  Pulmonary:      Effort: Pulmonary effort is normal.      Breath sounds: Normal breath sounds. Musculoskeletal:      Cervical back: Neck supple. Skin:     General: Skin is warm and dry. Neurological:      General: No focal deficit present. Mental Status: She is alert and oriented to person, place, and time. Mental status is at baseline. Motor: No weakness. Gait: Gait normal.   Psychiatric:         Mood and Affect: Mood normal.         Behavior: Behavior normal.         Thought Content: Thought content normal.         Judgment: Judgment normal.       Assessment:       Diagnosis Orders   1. Mixed hyperlipidemia  Lipid Panel    CBC with Auto Differential    Comprehensive Metabolic Panel      2. Cold intolerance  CBC with Auto Differential    Iron and TIBC    Ferritin      3. Essential hypertension  CBC with Auto Differential      4. History of thyroid disease  TSH      5. Freckles  Iron and TIBC    Ferritin        Wt Readings from Last 3 Encounters:   01/10/23 200 lb (90.7 kg)   08/27/21 200 lb (90.7 kg)   03/24/21 196 lb 9.6 oz (89.2 kg)     BP Readings from Last 3 Encounters:   01/10/23 128/72   08/27/21 (!) 158/80   03/24/21 138/72       Plan:      Return in about 5 weeks (around 2/17/2023) for AMV PHONE visit . Orders Placed This Encounter   Procedures    Lipid Panel     Standing Status:   Future     Standing Expiration Date:   1/10/2024     Order Specific Question:   Is Patient Fasting?/# of Hours     Answer:   8     Order Specific Question:   Has the patient fasted?      Answer:   Yes    CBC with Auto Differential     Standing Status:   Future     Standing Expiration Date:   1/10/2024    Comprehensive Metabolic Panel     Standing Status:   Future     Standing Expiration Date:   1/10/2024    Iron and TIBC     Standing Status:   Future     Standing Expiration Date:   1/11/2024     Order Specific Question: Is Patient Fasting? Answer:   4     Order Specific Question:   No of Hours? Answer:   yes    Ferritin     Standing Status:   Future     Standing Expiration Date:   1/10/2024    TSH     Standing Status:   Future     Standing Expiration Date:   1/10/2024         Patient given educational materials - see patient instructions. check new labs  Will call with test results  Continue other same meds for now      discussed use,benefit, and side effects of prescribed medications. All patient questions answered. Pt voiced understanding. Reviewed health maintenance. Instructed to continue currentmedications, diet and exercise.     Electronically signed by JULIA Tyler CNP, CNP on 1/10/2023 at 4:08 PM

## 2023-01-10 NOTE — PROGRESS NOTES
Visit Information    Have you changed or started any medications since your last visit including any over-the-counter medicines, vitamins, or herbal medicines? no   Have you stopped taking any of your medications? Is so, why? -  no  Are you having any side effects from any of your medications? - no    Have you seen any other physician or provider since your last visit?  no   Have you had any other diagnostic tests since your last visit?  no   Have you been seen in the emergency room and/or had an admission in a hospital since we last saw you?  no   Have you had your routine dental cleaning in the past 6 months?  no     Do you have an active MyChart account? If no, what is the barrier?   Yes    Patient Care Team:  JULIA Espinal CNP as PCP - General (Nurse Practitioner)  JULIA Espinal CNP as PCP - [facility] Provider    Medical History Review  Past Medical, Family, and Social History reviewed and  contribute to the patient presenting condition    Health Maintenance   Topic Date Due    Shingles vaccine (1 of 2) Never done    COVID-19 Vaccine (4 - Booster for Moderna series) 03/01/2022    DTaP/Tdap/Td vaccine (1 - Tdap) 02/15/2023 (Originally 3/27/1964)    Depression Screen  02/15/2023    Annual Wellness Visit (AWV)  02/16/2023    DEXA (modify frequency per FRAX score)  Completed    Pneumococcal 65+ years Vaccine  Completed    Hepatitis C screen  Completed    Hepatitis A vaccine  Aged Out    Hib vaccine  Aged Out    Meningococcal (ACWY) vaccine  Aged Out

## 2023-01-12 ENCOUNTER — HOSPITAL ENCOUNTER (OUTPATIENT)
Age: 78
Setting detail: SPECIMEN
Discharge: HOME | End: 2023-01-12

## 2023-01-12 DIAGNOSIS — Z86.39 HISTORY OF THYROID DISEASE: ICD-10-CM

## 2023-01-12 DIAGNOSIS — E78.2 MIXED HYPERLIPIDEMIA: ICD-10-CM

## 2023-01-12 DIAGNOSIS — L81.2 FRECKLES: ICD-10-CM

## 2023-01-12 DIAGNOSIS — I10 ESSENTIAL HYPERTENSION: ICD-10-CM

## 2023-01-12 DIAGNOSIS — R68.89 COLD INTOLERANCE: ICD-10-CM

## 2023-01-12 LAB
ABSOLUTE EOS #: 0.19 K/UL (ref 0–0.44)
ABSOLUTE IMMATURE GRANULOCYTE: <0.03 K/UL (ref 0–0.3)
ABSOLUTE LYMPH #: 1.94 K/UL (ref 1.1–3.7)
ABSOLUTE MONO #: 0.57 K/UL (ref 0.1–1.2)
ALBUMIN SERPL-MCNC: 4 G/DL (ref 3.5–5.2)
ALBUMIN/GLOBULIN RATIO: 1.2 (ref 1–2.5)
ALP BLD-CCNC: 72 U/L (ref 35–104)
ALT SERPL-CCNC: 11 U/L (ref 5–33)
ANION GAP SERPL CALCULATED.3IONS-SCNC: 14 MMOL/L (ref 9–17)
AST SERPL-CCNC: 19 U/L
BASOPHILS # BLD: 1 % (ref 0–2)
BASOPHILS ABSOLUTE: 0.06 K/UL (ref 0–0.2)
BILIRUB SERPL-MCNC: 0.8 MG/DL (ref 0.3–1.2)
BUN BLDV-MCNC: 10 MG/DL (ref 8–23)
CALCIUM SERPL-MCNC: 9.5 MG/DL (ref 8.6–10.4)
CHLORIDE BLD-SCNC: 99 MMOL/L (ref 98–107)
CHOLESTEROL/HDL RATIO: 3.2
CHOLESTEROL: 215 MG/DL
CO2: 25 MMOL/L (ref 20–31)
CREAT SERPL-MCNC: 0.61 MG/DL (ref 0.5–0.9)
EOSINOPHILS RELATIVE PERCENT: 3 % (ref 1–4)
FERRITIN: 55 NG/ML (ref 13–150)
GFR SERPL CREATININE-BSD FRML MDRD: >60 ML/MIN/1.73M2
GLUCOSE BLD-MCNC: 104 MG/DL (ref 70–99)
HCT VFR BLD CALC: 37.5 % (ref 36.3–47.1)
HDLC SERPL-MCNC: 68 MG/DL
HEMOGLOBIN: 13.1 G/DL (ref 11.9–15.1)
IMMATURE GRANULOCYTES: 0 %
IRON SATURATION: 30 % (ref 20–55)
IRON: 80 UG/DL (ref 37–145)
LDL CHOLESTEROL: 133 MG/DL (ref 0–130)
LYMPHOCYTES # BLD: 28 % (ref 24–43)
MCH RBC QN AUTO: 29.8 PG (ref 25.2–33.5)
MCHC RBC AUTO-ENTMCNC: 34.9 G/DL (ref 28.4–34.8)
MCV RBC AUTO: 85.2 FL (ref 82.6–102.9)
MONOCYTES # BLD: 8 % (ref 3–12)
NRBC AUTOMATED: 0 PER 100 WBC
PDW BLD-RTO: 12.9 % (ref 11.8–14.4)
PLATELET # BLD: 320 K/UL (ref 138–453)
PMV BLD AUTO: 10.9 FL (ref 8.1–13.5)
POTASSIUM SERPL-SCNC: 3.8 MMOL/L (ref 3.7–5.3)
RBC # BLD: 4.4 M/UL (ref 3.95–5.11)
SEG NEUTROPHILS: 60 % (ref 36–65)
SEGMENTED NEUTROPHILS ABSOLUTE COUNT: 4.13 K/UL (ref 1.5–8.1)
SODIUM BLD-SCNC: 138 MMOL/L (ref 135–144)
TOTAL IRON BINDING CAPACITY: 268 UG/DL (ref 250–450)
TOTAL PROTEIN: 7.4 G/DL (ref 6.4–8.3)
TRIGL SERPL-MCNC: 70 MG/DL
TSH SERPL DL<=0.05 MIU/L-ACNC: 1.48 UIU/ML (ref 0.3–5)
UNSATURATED IRON BINDING CAPACITY: 188 UG/DL (ref 112–347)
WBC # BLD: 6.9 K/UL (ref 3.5–11.3)

## 2023-02-17 ENCOUNTER — TELEMEDICINE (OUTPATIENT)
Dept: FAMILY MEDICINE CLINIC | Age: 78
End: 2023-02-17
Payer: MEDICARE

## 2023-02-17 DIAGNOSIS — Z00.00 MEDICARE ANNUAL WELLNESS VISIT, SUBSEQUENT: Primary | ICD-10-CM

## 2023-02-17 PROCEDURE — 1123F ACP DISCUSS/DSCN MKR DOCD: CPT | Performed by: NURSE PRACTITIONER

## 2023-02-17 PROCEDURE — G0439 PPPS, SUBSEQ VISIT: HCPCS | Performed by: NURSE PRACTITIONER

## 2023-02-17 SDOH — ECONOMIC STABILITY: INCOME INSECURITY: HOW HARD IS IT FOR YOU TO PAY FOR THE VERY BASICS LIKE FOOD, HOUSING, MEDICAL CARE, AND HEATING?: NOT HARD AT ALL

## 2023-02-17 SDOH — ECONOMIC STABILITY: FOOD INSECURITY: WITHIN THE PAST 12 MONTHS, THE FOOD YOU BOUGHT JUST DIDN'T LAST AND YOU DIDN'T HAVE MONEY TO GET MORE.: NEVER TRUE

## 2023-02-17 SDOH — ECONOMIC STABILITY: FOOD INSECURITY: WITHIN THE PAST 12 MONTHS, YOU WORRIED THAT YOUR FOOD WOULD RUN OUT BEFORE YOU GOT MONEY TO BUY MORE.: NEVER TRUE

## 2023-02-17 SDOH — ECONOMIC STABILITY: HOUSING INSECURITY
IN THE LAST 12 MONTHS, WAS THERE A TIME WHEN YOU DID NOT HAVE A STEADY PLACE TO SLEEP OR SLEPT IN A SHELTER (INCLUDING NOW)?: NO

## 2023-02-17 ASSESSMENT — LIFESTYLE VARIABLES
HOW MANY STANDARD DRINKS CONTAINING ALCOHOL DO YOU HAVE ON A TYPICAL DAY: PATIENT DOES NOT DRINK
HOW OFTEN DO YOU HAVE A DRINK CONTAINING ALCOHOL: NEVER

## 2023-02-17 ASSESSMENT — PATIENT HEALTH QUESTIONNAIRE - PHQ9
SUM OF ALL RESPONSES TO PHQ QUESTIONS 1-9: 0
SUM OF ALL RESPONSES TO PHQ QUESTIONS 1-9: 0
1. LITTLE INTEREST OR PLEASURE IN DOING THINGS: 0
SUM OF ALL RESPONSES TO PHQ QUESTIONS 1-9: 0
SUM OF ALL RESPONSES TO PHQ9 QUESTIONS 1 & 2: 0
SUM OF ALL RESPONSES TO PHQ QUESTIONS 1-9: 0
2. FEELING DOWN, DEPRESSED OR HOPELESS: 0

## 2023-02-17 NOTE — PROGRESS NOTES
Medicare Annual Wellness Visit    Surjit Nur is here for Medicare AWV    Assessment & Plan   Medicare annual wellness visit, subsequent   Doing well overall  Continue same meds/doses for now  Patient advised to follow a low carb, low sugar, healthy fat ( avocados, nuts, olive oil etc.) diet and 150 minutes of cardiovascular exercise per week   Labs utd     Recommendations for Preventive Services Due: see orders and patient instructions/AVS.  Recommended screening schedule for the next 5-10 years is provided to the patient in written form: see Patient Instructions/AVS.     Return for Medicare Annual Wellness Visit in 1 year. Subjective       Patient's complete Health Risk Assessment and screening values have been reviewed and are found in Flowsheets. The following problems were reviewed today and where indicated follow up appointments were made and/or referrals ordered.     Positive Risk Factor Screenings with Interventions:                 Weight and Activity:  Physical Activity: Inactive    Days of Exercise per Week: 0 days    Minutes of Exercise per Session: 0 min     On average, how many days per week do you engage in moderate to strenuous exercise (like a brisk walk)?: 0 days (trying to get into walking)  Have you lost any weight without trying in the past 3 months?: No         Inactivity Interventions:  Patient comments: plans to start walking program  Patient declined any further interventions or treatment  Obesity Interventions:  Patient declines any further evaluation or treatment                               Objective      Patient-Reported Vitals  Patient-Reported Weight: 200  Patient-Reported Height: 5'6              Allergies   Allergen Reactions    Codeine      Feels like will pass out    Other Other (See Comments)     Red dyes dizziness     Cortisone     Influenza Virus Vaccine Nausea And Vomiting    Synthroid [Levothyroxine Sodium] Nausea And Vomiting     Prior to Visit Medications Medication Sig Taking? Authorizing Provider   hydrOXYzine HCl (ATARAX) 25 MG tablet Take 25 mg by mouth 3 times daily as needed for Itching Yes Historical Provider, MD   tacrolimus (PROTOPIC) 0.1 % ointment Apply topically 2 times daily Apply topically 2 times daily. Yes Historical Provider, MD   polyethylene glycol (MIRALAX) 17 g PACK packet DISSOLVE 17 GRAMS IN 8 OUNCES OF LIQUID AND DRINK ONCE A DAY Yes JULIA Luo CNP   hydrALAZINE (APRESOLINE) 50 MG tablet Take 50 mg by mouth 2 times daily Yes Historical Provider, MD   ELIQUIS 5 MG TABS tablet  Yes Historical Provider, MD   cloNIDine (CATAPRES-TTS-3) 0.3 MG/24HR PTWK Place 1 patch onto the skin once a week Yes Zachary Aase, MD   Emollient (EUCERIN EX) Apply topically daily Yes Historical Provider, MD   Misc. Devices MISC Lateral  knee brace for the right knee Yes Kelvin Dobson Pry, DO   losartan-hydrochlorothiazide (HYZAAR) 100-25 MG per tablet Take 1 tablet by mouth nightly  Yes Historical Provider, MD Garcia (Including outside providers/suppliers regularly involved in providing care):   Patient Care Team:  JULIA Luo CNP as PCP - General (Nurse Practitioner)  JULIA Luo CNP as PCP - Empaneled Provider     Reviewed and updated this visit:  Tobacco  Allergies  Meds  Problems  Med Hx  Surg Hx  Soc Hx  Fam Hx           Sis MINA Dunn, was evaluated through a synchronous (real-time) audio-video encounter. The patient (or guardian if applicable) is aware that this is a billable service, which includes applicable co-pays. This Virtual Visit was conducted with patient's (and/or legal guardian's) consent. The visit was conducted pursuant to the emergency declaration under the [facility], [address] [place] [address] authority and the [employer] and [employer] General Act.   Patient identification was verified, and a caregiver was present when appropriate.    The patient was located at Home: [address] [place], [place] [id number]  Provider was located at Home ([facility]): JULIA Lucio CNP

## 2023-02-17 NOTE — PROGRESS NOTES
Visit Information    Have you changed or started any medications since your last visit including any over-the-counter medicines, vitamins, or herbal medicines? no   Have you stopped taking any of your medications? Is so, why? -  no  Are you having any side effects from any of your medications? - no    Have you seen any other physician or provider since your last visit?  no   Have you had any other diagnostic tests since your last visit?  no   Have you been seen in the emergency room and/or had an admission in a hospital since we last saw you?  no   Have you had your routine dental cleaning in the past 6 months?  no     Do you have an active MyChart account? If no, what is the barrier?   No    Patient Care Team:  JULIA Avalos CNP as PCP - General (Nurse Practitioner)  JULIA Avalos CNP as PCP - Empaneled Provider    Medical History Review  Past Medical, Family, and Social History reviewed and  contribute to the patient presenting condition    Health Maintenance   Topic Date Due    DTaP/Tdap/Td vaccine (1 - Tdap) Never done    Shingles vaccine (1 of 2) Never done    COVID-19 Vaccine (5 - Booster for [employer] Lied series) 03/01/2022    Annual Wellness Visit (AWV)  02/16/2023    Depression Screen  01/10/2024    DEXA (modify frequency per FRAX score)  Completed    Pneumococcal 65+ years Vaccine  Completed    Hepatitis C screen  Completed    Hepatitis A vaccine  Aged Out    Hib vaccine  Aged Out    Meningococcal (ACWY) vaccine  Aged Out

## 2023-02-17 NOTE — PATIENT INSTRUCTIONS
Learning About Being Active as an Older Adult  Why is being active important as you get older? Being active is one of the best things you can do for your health. And it's never too late to start. Being active--or getting active, if you aren't already--has definite benefits. It can:  Give you more energy,  Keep your mind sharp. Improve balance to reduce your risk of falls. Help you manage chronic illness with fewer medicines. No matter how old you are, how fit you are, or what health problems you have, there is a form of activity that will work for you. And the more physical activity you can do, the better your overall health will be. What kinds of activity can help you stay healthy? Being more active will make your daily activities easier. Physical activity includes planned exercise and things you do in daily life. There are four types of activity:  Aerobic. Doing aerobic activity makes your heart and lungs strong. Includes walking, dancing, and gardening. Aim for at least 2½ hours spread throughout the week. It improves your energy and can help you sleep better. Muscle-strengthening. This type of activity can help maintain muscle and strengthen bones. Includes climbing stairs, using resistance bands, and lifting or carrying heavy loads. Aim for at least twice a week. It can help protect the knees and other joints. Stretching. Stretching gives you better range of motion in joints and muscles. Includes upper arm stretches, calf stretches, and gentle yoga. Aim for at least twice a week, preferably after your muscles are warmed up from other activities. It can help you function better in daily life. Balancing. This helps you stay coordinated and have good posture. Includes heel-to-toe walking, alan chi, and certain types of yoga. Aim for at least 3 days a week. It can reduce your risk of falling.   Even if you have a hard time meeting the recommendations, it's better to be more active than less active. All activity done in each category counts toward your weekly total. You'd be surprised how daily things like carrying groceries, keeping up with grandchildren, and taking the stairs can add up. What keeps you from being active? If you've had a hard time being more active, you're not alone. Maybe you remember being able to do more. Or maybe you've never thought of yourself as being active. It's frustrating when you can't do the things you want. Being more active can help. What's holding you back? Getting started. Have a goal, but break it into easy tasks. Small steps build into big accomplishments. Staying motivated. If you feel like skipping your activity, remember your goal. Maybe you want to move better and stay independent. Every activity gets you one step closer. Not feeling your best.  Start with 5 minutes of an activity you enjoy. Prove to yourself you can do it. As you get comfortable, increase your time. You may not be where you want to be. But you're in the process of getting there. Everyone starts somewhere. How can you find safe ways to stay active? Talk with your doctor about any physical challenges you're facing. Make a plan with your doctor if you have a health problem or aren't sure how to get started with activity. If you're already active, ask your doctor if there is anything you should change to stay safe as your body and health change. If you tend to feel dizzy after you take medicine, avoid activity at that time. Try being active before you take your medicine. This will reduce your risk of falls. If you plan to be active at home, make sure to clear your space before you get started. Remove things like TV cords, coffee tables, and throw rugs. It's safest to have plenty of space to move freely. The key to getting more active is to take it slow and steady. Try to improve only a little bit at a time.  Pick just one area to improve on at first. And if an activity hurts, stop and talk to your doctor. Where can you learn more? Go to http://www.montanez.com/ and enter P600 to learn more about \"Learning About Being Active as an Older Adult. \"  Current as of: October 10, 2022               Content Version: 13.5  © 6774-3895 Healthwise, Incorporated. Care instructions adapted under license by [facility] ([facility]). If you have questions about a medical condition or this instruction, always ask your healthcare professional. [place] [address] any warranty or liability for your use of this information. Advance Directives: Care Instructions  Overview  An advance directive is a legal way to state your wishes at the end of your life. It tells your family and your doctor what to do if you can't say what you want. There are two main types of advance directives. You can change them any time your wishes change. Living will. This form tells your family and your doctor your wishes about life support and other treatment. The form is also called a declaration. Medical power of . This form lets you name a person to make treatment decisions for you when you can't speak for yourself. This person is called a health care agent (health care proxy, health care surrogate). The form is also called a durable power of  for health care. If you do not have an advance directive, decisions about your medical care may be made by a family member, or by a doctor or a  who doesn't know you. It may help to think of an advance directive as a gift to the people who care for you. If you have one, they won't have to make tough decisions by themselves. For more information, including forms for your state, see the [address] website (www.caringinfo.org/planning/advance-directives/). Follow-up care is a key part of your treatment and safety. Be sure to make and go to all appointments, and call your doctor if you are having problems.  It's also a good idea to know your test results and keep a list of the medicines you take. What should you include in an advance directive? Many states have a unique advance directive form. (It may ask you to address specific issues.) Or you might use a universal form that's approved by many states. If your form doesn't tell you what to address, it may be hard to know what to include in your advance directive. Use the questions below to help you get started. Who do you want to make decisions about your medical care if you are not able to? What life-support measures do you want if you have a serious illness that gets worse over time or can't be cured? What are you most afraid of that might happen? (Maybe you're afraid of having pain, losing your independence, or being kept alive by machines.)  Where would you prefer to die? (Your home? A hospital? A nursing home?)  Do you want to donate your organs when you die? Do you want certain Confucianism practices performed before you die? When should you call for help? Be sure to contact your doctor if you have any questions. Where can you learn more? Go to http://YelloYello.montanez.com/ and enter R264 to learn more about \"Advance Directives: Care Instructions. \"  Current as of: June 16, 2022               Content Version: 13.5  © 9278-8759 Healthwise, Incorporated. Care instructions adapted under license by [facility] ([facility]). If you have questions about a medical condition or this instruction, always ask your healthcare professional. [place] [address] any warranty or liability for your use of this information. Starting a Weight Loss Plan: Care Instructions  Overview     If you're thinking about losing weight, it can be hard to know where to start. Your doctor can help you set up a weight loss plan that best meets your needs. You may want to take a class on nutrition or exercise, or you could join a weight loss support group.  If you have questions about how to make changes to your eating or exercise habits, ask your doctor about seeing a registered dietitian or an exercise specialist.  It can be a big challenge to lose weight. But you don't have to make huge changes at once. Make small changes, and stick with them. When those changes become habit, add a few more changes. If you don't think you're ready to make changes right now, try to pick a date in the future. Make an appointment to see your doctor to discuss whether the time is right for you to start a plan. Follow-up care is a key part of your treatment and safety. Be sure to make and go to all appointments, and call your doctor if you are having problems. It's also a good idea to know your test results and keep a list of the medicines you take. How can you care for yourself at home? Set realistic goals. Many people expect to lose much more weight than is likely. A weight loss of 5% to 10% of your body weight may be enough to improve your health. Get family and friends involved to provide support. Talk to them about why you are trying to lose weight, and ask them to help. They can help by participating in exercise and having meals with you, even if they may be eating something different. Find what works best for you. If you do not have time or do not like to cook, a program that offers meal replacement bars or shakes may be better for you. Or if you like to prepare meals, finding a plan that includes daily menus and recipes may be best.  Ask your doctor about other health professionals who can help you achieve your weight loss goals. A dietitian can help you make healthy changes in your diet. An exercise specialist or  can help you develop a safe and effective exercise program.  A counselor or psychiatrist can help you cope with issues such as depression, anxiety, or family problems that can make it hard to focus on weight loss.   Consider joining a support group for people who are trying to lose weight. Your doctor can suggest groups in your area.  Where can you learn more?  Go to https://www.[employer].net/patientEd and enter U357 to learn more about \"Starting a Weight Loss Plan: Care Instructions.\"  Current as of: August 25, 2022               Content Version: 13.5  © 9519-3690 [employer].   Care instructions adapted under license by [employer]. If you have questions about a medical condition or this instruction, always ask your healthcare professional. [employer] disclaims any warranty or liability for your use of this information.           A Healthy Heart: Care Instructions  Your Care Instructions     Coronary artery disease, also called heart disease, occurs when a substance called plaque builds up in the vessels that supply oxygen-rich blood to your heart muscle. This can narrow the blood vessels and reduce blood flow. A heart attack happens when blood flow is completely blocked. A high-fat diet, smoking, and other factors increase the risk of heart disease.  Your doctor has found that you have a chance of having heart disease. You can do lots of things to keep your heart healthy. It may not be easy, but you can change your diet, exercise more, and quit smoking. These steps really work to lower your chance of heart disease.  Follow-up care is a key part of your treatment and safety. Be sure to make and go to all appointments, and call your doctor if you are having problems. It's also a good idea to know your test results and keep a list of the medicines you take.  How can you care for yourself at home?  Diet    Use less salt when you cook and eat. This helps lower your blood pressure. Taste food before salting. Add only a little salt when you think you need it. With time, your taste buds will adjust to less salt.     Eat fewer snack items, fast foods, canned soups, and other high-salt, high-fat, processed foods.     Read food labels and try to avoid saturated and trans  fats. They increase your risk of heart disease by raising cholesterol levels.     Limit the amount of solid fat-butter, margarine, and shortening-you eat. Use olive, peanut, or canola oil when you cook. Bake, broil, and steam foods instead of frying them.     Eat a variety of fruit and vegetables every day. Dark green, deep orange, red, or yellow fruits and vegetables are especially good for you. Examples include spinach, carrots, peaches, and berries.     Foods high in fiber can reduce your cholesterol and provide important vitamins and minerals. High-fiber foods include whole-grain cereals and breads, oatmeal, beans, brown rice, citrus fruits, and apples.     Eat lean proteins. Heart-healthy proteins include seafood, lean meats and poultry, eggs, beans, peas, nuts, seeds, and soy products.     Limit drinks and foods with added sugar. These include candy, desserts, and soda pop. Lifestyle changes    If your doctor recommends it, get more exercise. Walking is a good choice. Bit by bit, increase the amount you walk every day. Try for at least 30 minutes on most days of the week. You also may want to swim, bike, or do other activities.     Do not smoke. If you need help quitting, talk to your doctor about stop-smoking programs and medicines. These can increase your chances of quitting for good. Quitting smoking may be the most important step you can take to protect your heart. It is never too late to quit.     Limit alcohol to 2 drinks a day for men and 1 drink a day for women. Too much alcohol can cause health problems.     Manage other health problems such as diabetes, high blood pressure, and high cholesterol. If you think you may have a problem with alcohol or drug use, talk to your doctor. Medicines    Take your medicines exactly as prescribed. Call your doctor if you think you are having a problem with your medicine.     If your doctor recommends aspirin, take the amount directed each day.  Make sure you take aspirin and not another kind of pain reliever, such as acetaminophen (Tylenol). When should you call for help? Call 911 if you have symptoms of a heart attack. These may include:    Chest pain or pressure, or a strange feeling in the chest.     Sweating.     Shortness of breath.     Pain, pressure, or a strange feeling in the back, neck, jaw, or upper belly or in one or both shoulders or arms.     Lightheadedness or sudden weakness.     A fast or irregular heartbeat. After you call 911, the  may tell you to chew 1 adult-strength or 2 to 4 low-dose aspirin. Wait for an ambulance. Do not try to drive yourself. Watch closely for changes in your health, and be sure to contact your doctor if you have any problems. Where can you learn more? Go to http://SqueezeCMM.montanez.com/ and enter F075 to learn more about \"A Healthy Heart: Care Instructions. \"  Current as of: September 7, 2022               Content Version: 13.5  © 2006-2022 [employer]. Care instructions adapted under license by [address] [place] [address]. If you have questions about a medical condition or this instruction, always ask your healthcare professional. [place] [address] any warranty or liability for your use of this information. Personalized Preventive Plan for Nancy Loges - 2/17/2023  Medicare offers a range of preventive health benefits. Some of the tests and screenings are paid in full while other may be subject to a deductible, co-insurance, and/or copay. Some of these benefits include a comprehensive review of your medical history including lifestyle, illnesses that may run in your family, and various assessments and screenings as appropriate. After reviewing your medical record and screening and assessments performed today your provider may have ordered immunizations, labs, imaging, and/or referrals for you.   A list of these orders (if applicable) as well as your Preventive Care list are included within your After Visit Summary for your review. Other Preventive Recommendations:    A preventive eye exam performed by an eye specialist is recommended every 1-2 years to screen for glaucoma; cataracts, macular degeneration, and other eye disorders. A preventive dental visit is recommended every 6 months. Try to get at least 150 minutes of exercise per week or 10,000 steps per day on a pedometer . Order or download the FREE \"Exercise & Physical Activity: Your Everyday Guide\" from The [facility]. Call 3-265.610.2828 or search The [facility] online. You need 3647-1919 mg of calcium and 7132-7107 IU of vitamin D per day. It is possible to meet your calcium requirement with diet alone, but a vitamin D supplement is usually necessary to meet this goal.  When exposed to the sun, use a sunscreen that protects against both UVA and UVB radiation with an SPF of 30 or greater. Reapply every 2 to 3 hours or after sweating, drying off with a towel, or swimming. Always wear a seat belt when traveling in a car. Always wear a helmet when riding a bicycle or motorcycle.

## 2023-03-17 ENCOUNTER — TELEPHONE (OUTPATIENT)
Dept: FAMILY MEDICINE CLINIC | Age: 78
End: 2023-03-17

## 2023-03-17 RX ORDER — DILTIAZEM HCL
POWDER (GRAM) MISCELLANEOUS
Qty: 30 G | Refills: 3
Start: 2023-03-17

## 2023-03-17 NOTE — TELEPHONE ENCOUNTER
Patient contacted the office and is requesting DILTIAZEM. Patient states she isn't hurting now but after taking all the medications she needs to take for bowels to soften she was still straining and needs this medication to help after straining this morning. Please advise. Patient is requesting this script be sent to [facility] pharmacy.

## 2023-05-10 RX ORDER — OMEPRAZOLE 20 MG/1
CAPSULE, DELAYED RELEASE ORAL
Qty: 180 CAPSULE | Refills: 1 | Status: SHIPPED | OUTPATIENT
Start: 2023-05-10

## 2023-08-15 ENCOUNTER — TELEPHONE (OUTPATIENT)
Dept: FAMILY MEDICINE CLINIC | Age: 78
End: 2023-08-15

## 2023-08-15 DIAGNOSIS — N64.4 BREAST PAIN, LEFT: Primary | ICD-10-CM

## 2023-08-15 NOTE — TELEPHONE ENCOUNTER
Patient called stating that she had a mammogram done at [facility] in February and that it came back fine, however; she is currently experiencing discomfort in her left breast and wanted to know if Ayaka Heck wanted to see her or if another mammogram could be ordered. Please advise.

## 2023-09-15 DIAGNOSIS — N64.4 BREAST PAIN, LEFT: ICD-10-CM

## 2023-10-16 RX ORDER — OMEPRAZOLE 20 MG/1
CAPSULE, DELAYED RELEASE ORAL
Qty: 180 CAPSULE | Refills: 1 | Status: SHIPPED | OUTPATIENT
Start: 2023-10-16

## 2023-11-24 ENCOUNTER — HOSPITAL ENCOUNTER (OUTPATIENT)
Age: 78
Setting detail: SPECIMEN
Discharge: HOME | End: 2023-11-24

## 2023-11-24 ENCOUNTER — OFFICE VISIT (OUTPATIENT)
Dept: PRIMARY CARE CLINIC | Age: 78
End: 2023-11-24
Payer: COMMERCIAL

## 2023-11-24 VITALS
OXYGEN SATURATION: 99 % | HEART RATE: 78 BPM | WEIGHT: 198 LBS | SYSTOLIC BLOOD PRESSURE: 163 MMHG | DIASTOLIC BLOOD PRESSURE: 88 MMHG | BODY MASS INDEX: 31.96 KG/M2

## 2023-11-24 DIAGNOSIS — Z20.822 SUSPECTED COVID-19 VIRUS INFECTION: ICD-10-CM

## 2023-11-24 DIAGNOSIS — Z20.822 SUSPECTED COVID-19 VIRUS INFECTION: Primary | ICD-10-CM

## 2023-11-24 PROCEDURE — 99213 OFFICE O/P EST LOW 20 MIN: CPT | Performed by: FAMILY MEDICINE

## 2023-11-24 PROCEDURE — 3077F SYST BP >= 140 MM HG: CPT | Performed by: FAMILY MEDICINE

## 2023-11-24 PROCEDURE — 1123F ACP DISCUSS/DSCN MKR DOCD: CPT | Performed by: FAMILY MEDICINE

## 2023-11-24 PROCEDURE — 3079F DIAST BP 80-89 MM HG: CPT | Performed by: FAMILY MEDICINE

## 2023-11-24 ASSESSMENT — ENCOUNTER SYMPTOMS
COUGH: 1
SHORTNESS OF BREATH: 1
SORE THROAT: 0
RHINORRHEA: 0

## 2023-11-24 NOTE — PROGRESS NOTES
And Vomiting       Health Maintenance   Topic Date Due    DTaP/Tdap/Td vaccine (1 - Tdap) Never done    COVID-19 Vaccine (6 - 2023-24 season) 10/09/2023    Shingles vaccine (1 of 2) 06/15/2024 (Originally 3/27/1995)    Depression Screen  02/17/2024    Annual Wellness Visit (AWV)  02/18/2024    DEXA (modify frequency per FRAX score)  Completed    Pneumococcal 65+ years Vaccine  Completed    Hepatitis C screen  Completed    Hepatitis A vaccine  Aged Out    Hepatitis B vaccine  Aged Out    Hib vaccine  Aged Out    Meningococcal (ACWY) vaccine  Aged Out       :      Review of Systems   Constitutional:  Negative for chills and fever. HENT:  Positive for postnasal drip. Negative for rhinorrhea and sore throat. Respiratory:  Positive for cough and shortness of breath (yesterday). Allergic/Immunologic: Negative for environmental allergies. Objective:     Physical Exam  Vitals and nursing note reviewed. Constitutional:       Appearance: Normal appearance. She is obese. HENT:      Head: Normocephalic and atraumatic. Right Ear: Hearing, tympanic membrane, ear canal and external ear normal.      Left Ear: Hearing, tympanic membrane, ear canal and external ear normal.      Nose: Nose normal.      Mouth/Throat:      Lips: Pink. Mouth: Mucous membranes are moist.      Pharynx: Oropharynx is clear. Eyes:      Extraocular Movements: Extraocular movements intact. Conjunctiva/sclera: Conjunctivae normal.   Cardiovascular:      Rate and Rhythm: Normal rate and regular rhythm. Heart sounds: Normal heart sounds. Pulmonary:      Effort: Pulmonary effort is normal.      Breath sounds: Normal breath sounds. Musculoskeletal:      Cervical back: Normal range of motion. No muscular tenderness. Skin:     General: Skin is warm and dry. Neurological:      Mental Status: She is alert and oriented to person, place, and time. Mental status is at baseline.    Psychiatric:         Mood and Affect: Mood High Risk (score 12 or above)

## 2023-11-24 NOTE — PATIENT INSTRUCTIONS
Will send covid swab for culture and call with results  Continue over the counter cough/cold medications as needed for symptoms  If symptoms worsen or do not improve please follow-up with PCP or return to clinic  Monitor blood pressure at home to ensure it returns to normal as symptoms improve

## 2023-11-26 LAB
SARS-COV-2 RNA RESP QL NAA+PROBE: NORMAL
SARS-COV-2 RNA RESP QL NAA+PROBE: NOT DETECTED
SOURCE: NORMAL

## 2023-12-04 ENCOUNTER — OFFICE VISIT (OUTPATIENT)
Dept: FAMILY MEDICINE CLINIC | Age: 78
End: 2023-12-04
Payer: COMMERCIAL

## 2023-12-04 VITALS
BODY MASS INDEX: 32.11 KG/M2 | HEART RATE: 60 BPM | OXYGEN SATURATION: 98 % | HEIGHT: 66 IN | SYSTOLIC BLOOD PRESSURE: 130 MMHG | DIASTOLIC BLOOD PRESSURE: 74 MMHG | TEMPERATURE: 97 F | WEIGHT: 199.8 LBS

## 2023-12-04 DIAGNOSIS — B96.89 ACUTE BACTERIAL SINUSITIS: Primary | ICD-10-CM

## 2023-12-04 DIAGNOSIS — J01.90 ACUTE BACTERIAL SINUSITIS: Primary | ICD-10-CM

## 2023-12-04 DIAGNOSIS — R42 DIZZINESS: ICD-10-CM

## 2023-12-04 PROCEDURE — 3075F SYST BP GE 130 - 139MM HG: CPT | Performed by: NURSE PRACTITIONER

## 2023-12-04 PROCEDURE — 3078F DIAST BP <80 MM HG: CPT | Performed by: NURSE PRACTITIONER

## 2023-12-04 PROCEDURE — 99213 OFFICE O/P EST LOW 20 MIN: CPT | Performed by: NURSE PRACTITIONER

## 2023-12-04 PROCEDURE — 1123F ACP DISCUSS/DSCN MKR DOCD: CPT | Performed by: NURSE PRACTITIONER

## 2023-12-04 RX ORDER — RUXOLITINIB 15 MG/G
1.5 CREAM TOPICAL DAILY
COMMUNITY

## 2023-12-04 RX ORDER — AMOXICILLIN 500 MG/1
500 CAPSULE ORAL 2 TIMES DAILY
Qty: 14 CAPSULE | Refills: 0 | Status: SHIPPED | OUTPATIENT
Start: 2023-12-04 | End: 2023-12-11

## 2023-12-04 ASSESSMENT — ENCOUNTER SYMPTOMS
TROUBLE SWALLOWING: 0
SORE THROAT: 0
WHEEZING: 0
SINUS PRESSURE: 1
COUGH: 1
CHEST TIGHTNESS: 0
SINUS PAIN: 1
RHINORRHEA: 0
SHORTNESS OF BREATH: 0
ABDOMINAL PAIN: 0

## 2023-12-05 ENCOUNTER — HOSPITAL ENCOUNTER (OUTPATIENT)
Dept: VASCULAR LAB | Age: 78
Discharge: HOME | End: 2023-12-07
Payer: MEDICARE

## 2023-12-05 DIAGNOSIS — R42 DIZZINESS: ICD-10-CM

## 2023-12-05 LAB
VAS LEFT BULB EDV: 18 CM/S
VAS LEFT BULB PSV: 66 CM/S
VAS LEFT CCA DIST EDV: 16.2 CM/S
VAS LEFT CCA DIST PSV: 78.2 CM/S
VAS LEFT CCA PROX EDV: 21.2 CM/S
VAS LEFT CCA PROX PSV: 75.4 CM/S
VAS LEFT ECA EDV: 7.77 CM/S
VAS LEFT ECA PSV: 51.4 CM/S
VAS LEFT ICA DIST EDV: 19.6 CM/S
VAS LEFT ICA DIST PSV: 59.8 CM/S
VAS LEFT ICA PROX EDV: 18 CM/S
VAS LEFT ICA PROX PSV: 51.2 CM/S
VAS LEFT ICA/CCA PSV: 0.84 NO UNITS
VAS LEFT VERTEBRAL EDV: 16.5 CM/S
VAS LEFT VERTEBRAL PSV: 48.8 CM/S
VAS RIGHT BULB EDV: 22.7 CM/S
VAS RIGHT BULB PSV: 53.5 CM/S
VAS RIGHT CCA DIST EDV: 15 CM/S
VAS RIGHT CCA DIST PSV: 63.4 CM/S
VAS RIGHT CCA PROX EDV: 13.9 CM/S
VAS RIGHT CCA PROX PSV: 99.7 CM/S
VAS RIGHT ECA EDV: 5.06 CM/S
VAS RIGHT ECA PSV: 64.5 CM/S
VAS RIGHT ICA DIST EDV: 17.9 CM/S
VAS RIGHT ICA DIST PSV: 55.5 CM/S
VAS RIGHT ICA PROX EDV: 12.5 CM/S
VAS RIGHT ICA PROX PSV: 42.4 CM/S
VAS RIGHT ICA/CCA PSV: 0.9 NO UNITS
VAS RIGHT VERTEBRAL EDV: 12.75 CM/S
VAS RIGHT VERTEBRAL PSV: 38.1 CM/S

## 2023-12-05 PROCEDURE — 93880 EXTRACRANIAL BILAT STUDY: CPT

## 2023-12-05 PROCEDURE — 93880 EXTRACRANIAL BILAT STUDY: CPT | Performed by: SURGERY

## 2024-01-05 DIAGNOSIS — H93.13 TINNITUS OF BOTH EARS: Primary | ICD-10-CM

## 2024-01-05 RX ORDER — ASPIRIN 81 MG/1
81 TABLET ORAL DAILY
Qty: 90 TABLET | Refills: 1 | Status: SHIPPED | OUTPATIENT
Start: 2024-01-05

## 2024-01-05 NOTE — TELEPHONE ENCOUNTER
----- Message from LaSagar Wilhelm sent at 1/5/2024 11:51 AM EST -----  Subject: Referral Request    Reason for referral request? ENT Specialist for Ears(Ringing) Ear, Nose   And Throat (Otolaryngology)   Provider patient wants to be referred to(if known):     Provider Phone Number(if known):[phone]    Additional Information for Provider? Pt would like a referral sent over to   this location [facility] - [address], [place] [address], with the provider that is listed   (Tanna Dunn)above only. Please contact Pt when she can schedule if   possible  ---------------------------------------------------------------------------  --------------  CALL BACK INFO    [phone]; OK to leave message on voicemail,Do not leave any message,   patient will call back for answer  ---------------------------------------------------------------------------  --------------

## 2024-01-10 ENCOUNTER — TELEPHONE (OUTPATIENT)
Dept: FAMILY MEDICINE CLINIC | Age: 79
End: 2024-01-10

## 2024-01-10 DIAGNOSIS — R09.81 SINUS CONGESTION: ICD-10-CM

## 2024-01-10 DIAGNOSIS — H93.13 TINNITUS OF BOTH EARS: Primary | ICD-10-CM

## 2024-02-23 RX ORDER — OMEPRAZOLE 20 MG/1
CAPSULE, DELAYED RELEASE ORAL
Qty: 180 CAPSULE | Refills: 1 | Status: SHIPPED | OUTPATIENT
Start: 2024-02-23

## 2024-04-15 ENCOUNTER — OFFICE VISIT (OUTPATIENT)
Dept: FAMILY MEDICINE CLINIC | Age: 79
End: 2024-04-15
Payer: MEDICARE

## 2024-04-15 ENCOUNTER — NURSE ONLY (OUTPATIENT)
Dept: PRIMARY CARE CLINIC | Age: 79
End: 2024-04-15

## 2024-04-15 VITALS
OXYGEN SATURATION: 97 % | HEART RATE: 70 BPM | HEIGHT: 66 IN | WEIGHT: 205.2 LBS | BODY MASS INDEX: 32.98 KG/M2 | DIASTOLIC BLOOD PRESSURE: 74 MMHG | TEMPERATURE: 97.9 F | SYSTOLIC BLOOD PRESSURE: 136 MMHG

## 2024-04-15 DIAGNOSIS — R29.898 LEFT LEG WEAKNESS: ICD-10-CM

## 2024-04-15 DIAGNOSIS — G89.29 CHRONIC PAIN OF LEFT KNEE: ICD-10-CM

## 2024-04-15 DIAGNOSIS — R52 PAIN: Primary | ICD-10-CM

## 2024-04-15 DIAGNOSIS — Z00.00 MEDICARE ANNUAL WELLNESS VISIT, SUBSEQUENT: Primary | ICD-10-CM

## 2024-04-15 DIAGNOSIS — M25.562 CHRONIC PAIN OF LEFT KNEE: ICD-10-CM

## 2024-04-15 PROCEDURE — G0439 PPPS, SUBSEQ VISIT: HCPCS | Performed by: NURSE PRACTITIONER

## 2024-04-15 PROCEDURE — 3075F SYST BP GE 130 - 139MM HG: CPT | Performed by: NURSE PRACTITIONER

## 2024-04-15 PROCEDURE — G8399 PT W/DXA RESULTS DOCUMENT: HCPCS | Performed by: NURSE PRACTITIONER

## 2024-04-15 PROCEDURE — G8417 CALC BMI ABV UP PARAM F/U: HCPCS | Performed by: NURSE PRACTITIONER

## 2024-04-15 PROCEDURE — 3078F DIAST BP <80 MM HG: CPT | Performed by: NURSE PRACTITIONER

## 2024-04-15 PROCEDURE — 1090F PRES/ABSN URINE INCON ASSESS: CPT | Performed by: NURSE PRACTITIONER

## 2024-04-15 PROCEDURE — 1123F ACP DISCUSS/DSCN MKR DOCD: CPT | Performed by: NURSE PRACTITIONER

## 2024-04-15 PROCEDURE — G8427 DOCREV CUR MEDS BY ELIG CLIN: HCPCS | Performed by: NURSE PRACTITIONER

## 2024-04-15 PROCEDURE — 1036F TOBACCO NON-USER: CPT | Performed by: NURSE PRACTITIONER

## 2024-04-15 PROCEDURE — 99213 OFFICE O/P EST LOW 20 MIN: CPT | Performed by: NURSE PRACTITIONER

## 2024-04-15 SDOH — ECONOMIC STABILITY: FOOD INSECURITY: WITHIN THE PAST 12 MONTHS, THE FOOD YOU BOUGHT JUST DIDN'T LAST AND YOU DIDN'T HAVE MONEY TO GET MORE.: NEVER TRUE

## 2024-04-15 SDOH — ECONOMIC STABILITY: FOOD INSECURITY: WITHIN THE PAST 12 MONTHS, YOU WORRIED THAT YOUR FOOD WOULD RUN OUT BEFORE YOU GOT MONEY TO BUY MORE.: NEVER TRUE

## 2024-04-15 SDOH — ECONOMIC STABILITY: INCOME INSECURITY: HOW HARD IS IT FOR YOU TO PAY FOR THE VERY BASICS LIKE FOOD, HOUSING, MEDICAL CARE, AND HEATING?: NOT HARD AT ALL

## 2024-04-15 ASSESSMENT — ENCOUNTER SYMPTOMS
DIARRHEA: 0
CHEST TIGHTNESS: 0
SHORTNESS OF BREATH: 0
NAUSEA: 0
ABDOMINAL PAIN: 0
BACK PAIN: 0
CONSTIPATION: 0
WHEEZING: 0

## 2024-04-15 ASSESSMENT — PATIENT HEALTH QUESTIONNAIRE - PHQ9
SUM OF ALL RESPONSES TO PHQ QUESTIONS 1-9: 0
1. LITTLE INTEREST OR PLEASURE IN DOING THINGS: NOT AT ALL
SUM OF ALL RESPONSES TO PHQ QUESTIONS 1-9: 0
SUM OF ALL RESPONSES TO PHQ9 QUESTIONS 1 & 2: 0
SUM OF ALL RESPONSES TO PHQ QUESTIONS 1-9: 0
2. FEELING DOWN, DEPRESSED OR HOPELESS: NOT AT ALL
SUM OF ALL RESPONSES TO PHQ QUESTIONS 1-9: 0

## 2024-04-15 NOTE — PATIENT INSTRUCTIONS
sure to contact your doctor if you have any problems.  Where can you learn more?  Go to https://www.[employer].net/patientEd and enter F075 to learn more about \"A Healthy Heart: Care Instructions.\"  Current as of: June 24, 2023               Content Version: 14.0  © 1245-4703 [employer].   Care instructions adapted under license by [employer]. If you have questions about a medical condition or this instruction, always ask your healthcare professional. [employer] disclaims any warranty or liability for your use of this information.      Personalized Preventive Plan for Sis Dunn - 4/15/2024  Medicare offers a range of preventive health benefits. Some of the tests and screenings are paid in full while other may be subject to a deductible, co-insurance, and/or copay.    Some of these benefits include a comprehensive review of your medical history including lifestyle, illnesses that may run in your family, and various assessments and screenings as appropriate.    After reviewing your medical record and screening and assessments performed today your provider may have ordered immunizations, labs, imaging, and/or referrals for you.  A list of these orders (if applicable) as well as your Preventive Care list are included within your After Visit Summary for your review.    Other Preventive Recommendations:    A preventive eye exam performed by an eye specialist is recommended every 1-2 years to screen for glaucoma; cataracts, macular degeneration, and other eye disorders.  A preventive dental visit is recommended every 6 months.  Try to get at least 150 minutes of exercise per week or 10,000 steps per day on a pedometer .  Order or download the FREE \"Exercise & Physical Activity: Your Everyday Guide\" from The [employer] A[employer]. Call 1-672.435.3981 or search The [employer] online.  You need 5341-0265 mg of calcium and 4602-3636 IU of vitamin D per day. It is

## 2024-04-15 NOTE — PROGRESS NOTES
Visit Information    Have you changed or started any medications since your last visit including any over-the-counter medicines, vitamins, or herbal medicines? no   Have you stopped taking any of your medications? Is so, why? -  no  Are you having any side effects from any of your medications? - no    Have you seen any other physician or provider since your last visit?  no   Have you had any other diagnostic tests since your last visit?  no   Have you been seen in the emergency room and/or had an admission in a hospital since we last saw you?  no   Have you had your routine dental cleaning in the past 6 months?  no     Do you have an active MyChart account? If no, what is the barrier?  No: na    Patient Care Team:  Bhavani Bush APRN - CNP as PCP - General (Nurse Practitioner)  Bhavani Bush APRN - CNP as PCP - Empaneled Provider    Medical History Review  Past Medical, Family, and Social History reviewed and  contribute to the patient presenting condition    Health Maintenance   Topic Date Due    DTaP/Tdap/Td vaccine (1 - Tdap) Never done    Respiratory Syncytial Virus (RSV) Pregnant or age 60 yrs+ (1 - 1-dose 60+ series) Never done    COVID-19 Vaccine (6 - 2023-24 season) 10/09/2023    Depression Screen  02/17/2024    Annual Wellness Visit (Medicare)  02/18/2024    Shingles vaccine (1 of 2) 06/15/2024 (Originally 3/27/1995)    DEXA (modify frequency per FRAX score)  Completed    Pneumococcal 65+ years Vaccine  Completed    Hepatitis A vaccine  Aged Out    Hepatitis B vaccine  Aged Out    Hib vaccine  Aged Out    Polio vaccine  Aged Out    Meningococcal (ACWY) vaccine  Aged Out    Hepatitis C screen  Discontinued

## 2024-04-15 NOTE — PROGRESS NOTES
[facility]  [address] [place] [address]  [phone]      Sis Dunn is a 79 y.o. female who presents today for her  medicalconditions/complaints as noted below.  Sis Dunn is c/o of Medicare AWV and Knee Pain (Left, genealized in knee, feels it is throughing her off balance, will feel pain going down leg and foot will feel hot/)  .    HPI:   Patient presents today for a Medicare wellness exam. She is also reporting recent joint pain. She had pain, swelling, and warmth in her left knee about 2 weeks ago. Then started having warmth in her left foot as well with numbness/tingling. She iced her knee and that helped the swelling. Symptoms have now resolved, although she feels generalized weakness in the left lower extremity.  Has hx of arthroscopy on left knee in the past.     Past Medical History:   Diagnosis Date    Atrial flutter (HCC)     with palpitations    Chronic constipation     whole life    Eczema     Frequent sinus infections     GERD (gastroesophageal reflux disease)     resolved    Hemorrhoid 10/26/2016    internal and external    Hemorrhoids 8/22/2017    Hypertension     Pain     Palpitations     Shingles     Tooth wear     FALSE TOOTH LOWER BOTTOM REMOVABLE PERMANENT DENTAL WORK    Wears glasses       Past Surgical History:   Procedure Laterality Date    ANUS SURGERY      fistulotomy    APPENDECTOMY  1960    BREAST BIOPSY Left 1988    COLONOSCOPY  10/26/2016    adenomatous polyps(tubular adenomas)    HYSTERECTOMY (CERVIX STATUS UNKNOWN)      FIBROIDS; one ovarian preservation    KNEE ARTHROSCOPY      LEFT    LUMBAR DISC SURGERY      LUMBAR FUSION  12/30/2013    Transforaminal Lumbar Interbody Fusion L4-5, S1    THYROIDECTOMY, PARTIAL      TONSILLECTOMY      TOTAL KNEE ARTHROPLASTY Right 2018     Family History   Problem Relation Age of Onset    Cancer Sister         pancrease    Heart Disease Mother     Heart Disease Father     Diabetes Brother     No

## 2024-04-19 DIAGNOSIS — M25.562 CHRONIC PAIN OF LEFT KNEE: Primary | ICD-10-CM

## 2024-04-19 DIAGNOSIS — G89.29 CHRONIC PAIN OF LEFT KNEE: Primary | ICD-10-CM

## 2024-09-05 ENCOUNTER — OFFICE VISIT (OUTPATIENT)
Dept: FAMILY MEDICINE CLINIC | Age: 79
End: 2024-09-05
Payer: MEDICARE

## 2024-09-05 VITALS
WEIGHT: 203.2 LBS | BODY MASS INDEX: 32.66 KG/M2 | SYSTOLIC BLOOD PRESSURE: 138 MMHG | HEART RATE: 90 BPM | HEIGHT: 66 IN | TEMPERATURE: 98.8 F | DIASTOLIC BLOOD PRESSURE: 68 MMHG | OXYGEN SATURATION: 97 %

## 2024-09-05 DIAGNOSIS — J06.9 VIRAL URI: Primary | ICD-10-CM

## 2024-09-05 PROCEDURE — G8427 DOCREV CUR MEDS BY ELIG CLIN: HCPCS | Performed by: NURSE PRACTITIONER

## 2024-09-05 PROCEDURE — 1036F TOBACCO NON-USER: CPT | Performed by: NURSE PRACTITIONER

## 2024-09-05 PROCEDURE — 1090F PRES/ABSN URINE INCON ASSESS: CPT | Performed by: NURSE PRACTITIONER

## 2024-09-05 PROCEDURE — 99213 OFFICE O/P EST LOW 20 MIN: CPT | Performed by: NURSE PRACTITIONER

## 2024-09-05 PROCEDURE — 3078F DIAST BP <80 MM HG: CPT | Performed by: NURSE PRACTITIONER

## 2024-09-05 PROCEDURE — 1123F ACP DISCUSS/DSCN MKR DOCD: CPT | Performed by: NURSE PRACTITIONER

## 2024-09-05 PROCEDURE — G8417 CALC BMI ABV UP PARAM F/U: HCPCS | Performed by: NURSE PRACTITIONER

## 2024-09-05 PROCEDURE — G8399 PT W/DXA RESULTS DOCUMENT: HCPCS | Performed by: NURSE PRACTITIONER

## 2024-09-05 PROCEDURE — 3075F SYST BP GE 130 - 139MM HG: CPT | Performed by: NURSE PRACTITIONER

## 2024-09-05 ASSESSMENT — ENCOUNTER SYMPTOMS
VOMITING: 0
SHORTNESS OF BREATH: 0
SINUS PRESSURE: 1
NAUSEA: 0
COUGH: 1
CHEST TIGHTNESS: 0
RHINORRHEA: 1
ABDOMINAL PAIN: 0
SORE THROAT: 0
SINUS COMPLAINT: 1
TROUBLE SWALLOWING: 0
VOICE CHANGE: 1

## 2024-09-05 NOTE — PROGRESS NOTES
Visit Information    Have you changed or started any medications since your last visit including any over-the-counter medicines, vitamins, or herbal medicines? no   Have you stopped taking any of your medications? Is so, why? -  no  Are you having any side effects from any of your medications? - no    Have you seen any other physician or provider since your last visit?  no   Have you had any other diagnostic tests since your last visit?  no   Have you been seen in the emergency room and/or had an admission in a hospital since we last saw you?  no   Have you had your routine dental cleaning in the past 6 months?  no     Do you have an active MyChart account? If no, what is the barrier?  No: na    Patient Care Team:  Bhavani Bush, APRN - CNP as PCP - General (Nurse Practitioner)  Bhavani Bush APRN - CNP as PCP - Empaneled Provider    Medical History Review  Past Medical, Family, and Social History reviewed and  contribute to the patient presenting condition    Health Maintenance   Topic Date Due    DTaP/Tdap/Td vaccine (1 - Tdap) Never done    Shingles vaccine (1 of 2) Never done    Respiratory Syncytial Virus (RSV) Pregnant or age 60 yrs+ (1 - 1-dose 60+ series) Never done    COVID-19 Vaccine (6 - 2023-24 season) 09/01/2024    Depression Screen  04/15/2025    Annual Wellness Visit (Medicare)  04/16/2025    DEXA (modify frequency per FRAX score)  Completed    Pneumococcal 65+ years Vaccine  Completed    Hepatitis A vaccine  Aged Out    Hepatitis B vaccine  Aged Out    Hib vaccine  Aged Out    Polio vaccine  Aged Out    Meningococcal (ACWY) vaccine  Aged Out    Hepatitis C screen  Discontinued

## 2024-09-05 NOTE — PROGRESS NOTES
[facility]  [address] [place] [address]  [phone]      Sis Dunn is a 79 y.o. female who presents today for her  medicalconditions/complaints as noted below.  Sis Dunn is c/o of Sinus Problem (Started Fri/Sat, was in [place], took otc sinus med she took with her.  Sat evening she had chills, using Airborne, nasal spray, cough drops.  No fever, no headache.  Nose stuffed up, has a lot of mucous)  .    HPI:    Sinus Problem  Associated symptoms include chills, congestion, coughing and sinus pressure. Pertinent negatives include no diaphoresis, ear pain, headaches, shortness of breath, sneezing or sore throat.     Patient here today for evaluation of illness.  States she was traveling in [place] and [place] recently with her family.  Her son has developed pneumonia recently.  She has tested negative for COVID-19.  Denies any fever or chills and overall in the last 5 days feels that she is getting slowly better on her own.  She is having clear drainage from her sinuses and a mild dry cough.  Denies shortness of breath, wheezing, chest tightness.      Please note that this chart was generated using voice recognition Dragon dictation software.  Although every effort was made to ensure the accuracy of this automated transcription, some errors in transcription may have occurred.    Past Medical History:   Diagnosis Date    Atrial flutter (HCC)     with palpitations    Chronic constipation     whole life    Eczema     Frequent sinus infections     GERD (gastroesophageal reflux disease)     resolved    Hemorrhoid 10/26/2016    internal and external    Hemorrhoids 8/22/2017    Hypertension     Pain     Palpitations     Shingles     Tooth wear     FALSE TOOTH LOWER BOTTOM REMOVABLE PERMANENT DENTAL WORK    Wears glasses       Past Surgical History:   Procedure Laterality Date    ANUS SURGERY      fistulotomy    APPENDECTOMY  1960    BREAST BIOPSY Left 1988    COLONOSCOPY

## 2024-09-09 ENCOUNTER — LAB (OUTPATIENT)
Dept: PRIMARY CARE CLINIC | Age: 79
End: 2024-09-09

## 2024-09-09 ENCOUNTER — OFFICE VISIT (OUTPATIENT)
Dept: FAMILY MEDICINE CLINIC | Age: 79
End: 2024-09-09
Payer: MEDICARE

## 2024-09-09 VITALS
SYSTOLIC BLOOD PRESSURE: 138 MMHG | TEMPERATURE: 98.4 F | DIASTOLIC BLOOD PRESSURE: 70 MMHG | HEART RATE: 86 BPM | BODY MASS INDEX: 32.43 KG/M2 | HEIGHT: 66 IN | OXYGEN SATURATION: 98 % | WEIGHT: 201.8 LBS

## 2024-09-09 DIAGNOSIS — J06.9 ACUTE URI: Primary | ICD-10-CM

## 2024-09-09 DIAGNOSIS — J06.9 ACUTE URI: ICD-10-CM

## 2024-09-09 LAB
INFLUENZA A ANTIGEN, POC: NEGATIVE
INFLUENZA B ANTIGEN, POC: NEGATIVE
LOT EXPIRE DATE: NORMAL
LOT KIT NUMBER: NORMAL
SARS-COV-2 RNA, POC: NEGATIVE
VALID INTERNAL CONTROL: POSITIVE
VENDOR AND KIT NAME POC: NORMAL

## 2024-09-09 PROCEDURE — 3075F SYST BP GE 130 - 139MM HG: CPT | Performed by: NURSE PRACTITIONER

## 2024-09-09 PROCEDURE — 99213 OFFICE O/P EST LOW 20 MIN: CPT | Performed by: NURSE PRACTITIONER

## 2024-09-09 PROCEDURE — 1090F PRES/ABSN URINE INCON ASSESS: CPT | Performed by: NURSE PRACTITIONER

## 2024-09-09 PROCEDURE — 3078F DIAST BP <80 MM HG: CPT | Performed by: NURSE PRACTITIONER

## 2024-09-09 PROCEDURE — G8427 DOCREV CUR MEDS BY ELIG CLIN: HCPCS | Performed by: NURSE PRACTITIONER

## 2024-09-09 PROCEDURE — G8399 PT W/DXA RESULTS DOCUMENT: HCPCS | Performed by: NURSE PRACTITIONER

## 2024-09-09 PROCEDURE — 87428 SARSCOV & INF VIR A&B AG IA: CPT | Performed by: NURSE PRACTITIONER

## 2024-09-09 PROCEDURE — 1036F TOBACCO NON-USER: CPT | Performed by: NURSE PRACTITIONER

## 2024-09-09 PROCEDURE — G8417 CALC BMI ABV UP PARAM F/U: HCPCS | Performed by: NURSE PRACTITIONER

## 2024-09-09 PROCEDURE — 1123F ACP DISCUSS/DSCN MKR DOCD: CPT | Performed by: NURSE PRACTITIONER

## 2024-09-09 RX ORDER — DOXYCYCLINE HYCLATE 100 MG
100 TABLET ORAL 2 TIMES DAILY
Qty: 14 TABLET | Refills: 0 | Status: SHIPPED | OUTPATIENT
Start: 2024-09-09 | End: 2024-09-16

## 2024-09-09 RX ORDER — DOXYCYCLINE HYCLATE 100 MG
100 TABLET ORAL 2 TIMES DAILY
Qty: 14 TABLET | Refills: 0 | Status: SHIPPED | OUTPATIENT
Start: 2024-09-09 | End: 2024-09-09 | Stop reason: SDUPTHER

## 2024-09-09 RX ORDER — DOXYCYCLINE HYCLATE 100 MG
100 TABLET ORAL 2 TIMES DAILY
Qty: 14 TABLET | Refills: 0 | OUTPATIENT
Start: 2024-09-09 | End: 2024-09-16

## 2024-09-09 RX ORDER — BENZONATATE 200 MG/1
200 CAPSULE ORAL 3 TIMES DAILY PRN
Qty: 30 CAPSULE | Refills: 0 | Status: SHIPPED | OUTPATIENT
Start: 2024-09-09 | End: 2024-09-09 | Stop reason: SDUPTHER

## 2024-09-09 RX ORDER — BENZONATATE 200 MG/1
200 CAPSULE ORAL 3 TIMES DAILY PRN
Qty: 30 CAPSULE | Refills: 0 | Status: SHIPPED | OUTPATIENT
Start: 2024-09-09 | End: 2024-09-16

## 2024-09-09 ASSESSMENT — ENCOUNTER SYMPTOMS
SORE THROAT: 0
DIARRHEA: 0
COUGH: 1
ABDOMINAL PAIN: 0
SHORTNESS OF BREATH: 0
VOMITING: 0
SINUS PAIN: 0
RHINORRHEA: 1
NAUSEA: 0

## 2024-09-16 ENCOUNTER — TELEPHONE (OUTPATIENT)
Dept: FAMILY MEDICINE CLINIC | Age: 79
End: 2024-09-16

## 2024-09-16 DIAGNOSIS — J06.9 ACUTE URI: Primary | ICD-10-CM

## 2024-09-16 RX ORDER — DEXTROMETHORPHAN HYDROBROMIDE AND PROMETHAZINE HYDROCHLORIDE 15; 6.25 MG/5ML; MG/5ML
5 SYRUP ORAL 4 TIMES DAILY PRN
Qty: 150 ML | Refills: 0 | Status: SHIPPED | OUTPATIENT
Start: 2024-09-16 | End: 2024-09-23

## 2024-09-30 ENCOUNTER — OFFICE VISIT (OUTPATIENT)
Dept: FAMILY MEDICINE CLINIC | Age: 79
End: 2024-09-30
Payer: MEDICARE

## 2024-09-30 ENCOUNTER — TELEPHONE (OUTPATIENT)
Dept: FAMILY MEDICINE CLINIC | Age: 79
End: 2024-09-30

## 2024-09-30 VITALS
BODY MASS INDEX: 32.21 KG/M2 | SYSTOLIC BLOOD PRESSURE: 130 MMHG | WEIGHT: 200.4 LBS | DIASTOLIC BLOOD PRESSURE: 64 MMHG | HEIGHT: 66 IN | TEMPERATURE: 97.5 F | HEART RATE: 87 BPM | OXYGEN SATURATION: 97 %

## 2024-09-30 DIAGNOSIS — J40 BRONCHITIS: Primary | ICD-10-CM

## 2024-09-30 PROCEDURE — 99213 OFFICE O/P EST LOW 20 MIN: CPT | Performed by: NURSE PRACTITIONER

## 2024-09-30 PROCEDURE — 3078F DIAST BP <80 MM HG: CPT | Performed by: NURSE PRACTITIONER

## 2024-09-30 PROCEDURE — 1090F PRES/ABSN URINE INCON ASSESS: CPT | Performed by: NURSE PRACTITIONER

## 2024-09-30 PROCEDURE — 1123F ACP DISCUSS/DSCN MKR DOCD: CPT | Performed by: NURSE PRACTITIONER

## 2024-09-30 PROCEDURE — G8417 CALC BMI ABV UP PARAM F/U: HCPCS | Performed by: NURSE PRACTITIONER

## 2024-09-30 PROCEDURE — G8399 PT W/DXA RESULTS DOCUMENT: HCPCS | Performed by: NURSE PRACTITIONER

## 2024-09-30 PROCEDURE — G8427 DOCREV CUR MEDS BY ELIG CLIN: HCPCS | Performed by: NURSE PRACTITIONER

## 2024-09-30 PROCEDURE — 3075F SYST BP GE 130 - 139MM HG: CPT | Performed by: NURSE PRACTITIONER

## 2024-09-30 PROCEDURE — 1036F TOBACCO NON-USER: CPT | Performed by: NURSE PRACTITIONER

## 2024-09-30 RX ORDER — FLUTICASONE FUROATE, UMECLIDINIUM BROMIDE AND VILANTEROL TRIFENATATE 100; 62.5; 25 UG/1; UG/1; UG/1
1 POWDER RESPIRATORY (INHALATION) DAILY
COMMUNITY
End: 2024-09-30 | Stop reason: SDUPTHER

## 2024-09-30 RX ORDER — FLUTICASONE FUROATE, UMECLIDINIUM BROMIDE AND VILANTEROL TRIFENATATE 100; 62.5; 25 UG/1; UG/1; UG/1
1 POWDER RESPIRATORY (INHALATION) DAILY
Qty: 1 EACH | Refills: 1 | Status: SHIPPED | COMMUNITY
Start: 2024-09-30

## 2024-09-30 RX ORDER — ALBUTEROL SULFATE 90 UG/1
2 INHALANT RESPIRATORY (INHALATION) 4 TIMES DAILY PRN
Qty: 18 G | Refills: 0 | Status: SHIPPED | OUTPATIENT
Start: 2024-09-30

## 2024-09-30 ASSESSMENT — ENCOUNTER SYMPTOMS
CHEST TIGHTNESS: 1
RHINORRHEA: 0
SORE THROAT: 0
NAUSEA: 0
ABDOMINAL PAIN: 0
WHEEZING: 0
COUGH: 1
SINUS PRESSURE: 0
TROUBLE SWALLOWING: 0
SHORTNESS OF BREATH: 0
VOMITING: 0

## 2024-09-30 NOTE — TELEPHONE ENCOUNTER
Pt stated was prescribed a cough syrup and it is not really working pt is asking to be seen asap  the soonest appointment was 10/02/2023 pt is requesting to be seen sooner if at all possible also would like to have cxr ordered please call pt with any new information

## 2024-09-30 NOTE — PROGRESS NOTES
Visit Information    Have you changed or started any medications since your last visit including any over-the-counter medicines, vitamins, or herbal medicines? no   Have you stopped taking any of your medications? Is so, why? -  no  Are you having any side effects from any of your medications? - no    Have you seen any other physician or provider since your last visit?  no   Have you had any other diagnostic tests since your last visit?  no   Have you been seen in the emergency room and/or had an admission in a hospital since we last saw you?  no   Have you had your routine dental cleaning in the past 6 months?  no     Do you have an active MyChart account? If no, what is the barrier?  no    Patient Care Team:  Bhavani Bush APRN - CNP as PCP - General (Nurse Practitioner)  Bhavani Bush APRN - CNP as PCP - Empaneled Provider    Medical History Review  Past Medical, Family, and Social History reviewed and  contribute to the patient presenting condition    Health Maintenance   Topic Date Due    DTaP/Tdap/Td vaccine (1 - Tdap) Never done    Shingles vaccine (1 of 2) Never done    Respiratory Syncytial Virus (RSV) Pregnant or age 60 yrs+ (1 - 1-dose 60+ series) Never done    COVID-19 Vaccine (6 - 2023-24 season) 09/01/2024    Depression Screen  04/15/2025    Annual Wellness Visit (Medicare)  04/16/2025    DEXA (modify frequency per FRAX score)  Completed    Pneumococcal 65+ years Vaccine  Completed    Hepatitis A vaccine  Aged Out    Hepatitis B vaccine  Aged Out    Hib vaccine  Aged Out    Polio vaccine  Aged Out    Meningococcal (ACWY) vaccine  Aged Out    Hepatitis C screen  Discontinued             
APRN - CNP,CNP on 9/30/2024 at 9:49 AM

## 2024-10-04 ENCOUNTER — TELEPHONE (OUTPATIENT)
Dept: FAMILY MEDICINE CLINIC | Age: 79
End: 2024-10-04

## 2024-10-04 NOTE — TELEPHONE ENCOUNTER
I gave her this inhaler for short term use for her illness. I would like to see how she does when completed the whole sample if symptoms return>

## 2024-10-04 NOTE — TELEPHONE ENCOUNTER
Patient called to let you know the Trelegy inhaler you gave her is very effective and works well the ventolin isn't. She stated her trelegy inhaler has a 4 on it and she believes that mean she is almost out. Can this inhaler be sent to her pharmacy or is it not usually covered?

## 2024-10-21 ENCOUNTER — TELEPHONE (OUTPATIENT)
Dept: FAMILY MEDICINE CLINIC | Age: 79
End: 2024-10-21

## 2024-10-21 NOTE — TELEPHONE ENCOUNTER
Patient states that she has a cough for over a month and has been on medication but it has not helped. Cough gets worse first thing in the morning and at night. State she was on inhalers Trelegy did help, she did not use the albuterol to much because she did not have much wheezing. Phile states her left side shoulder area having some aches.  Please advise

## 2024-10-21 NOTE — TELEPHONE ENCOUNTER
Has she tried any otc allergy pills daily? There is a lot of environmental things that could be triggering the cough? Also unsure about shoulder as she would need to be seen for more evaluation.

## 2024-10-22 NOTE — TELEPHONE ENCOUNTER
Pt has been informed and stated she was already going to try that as well as the nose spray from ENT

## 2024-10-22 NOTE — TELEPHONE ENCOUNTER
Patient is willing to take otc allergy medication. Patient is asking if it makes a difference in which one she takes.      Please advise

## 2024-12-18 RX ORDER — DILTIAZEM HCL
POWDER (GRAM) MISCELLANEOUS
Qty: 30 G | Refills: 3 | Status: SHIPPED | OUTPATIENT
Start: 2024-12-18

## 2024-12-26 NOTE — TELEPHONE ENCOUNTER
[facility] called and stated they do not have this gel.   Previous script that was sent in march 2023 was sent to another compound pharmacy.     New rx pended with  previous pharmacy

## 2024-12-27 RX ORDER — DILTIAZEM HCL
POWDER (GRAM) MISCELLANEOUS
Qty: 30 G | Refills: 3 | Status: SHIPPED | OUTPATIENT
Start: 2024-12-27

## 2025-01-02 ENCOUNTER — OFFICE VISIT (OUTPATIENT)
Dept: FAMILY MEDICINE CLINIC | Age: 80
End: 2025-01-02
Payer: MEDICARE

## 2025-01-02 VITALS
DIASTOLIC BLOOD PRESSURE: 82 MMHG | BODY MASS INDEX: 32.05 KG/M2 | SYSTOLIC BLOOD PRESSURE: 136 MMHG | WEIGHT: 199.4 LBS | HEART RATE: 73 BPM | TEMPERATURE: 97.2 F | OXYGEN SATURATION: 98 % | HEIGHT: 66 IN

## 2025-01-02 DIAGNOSIS — Z86.19 HISTORY OF SHINGLES: ICD-10-CM

## 2025-01-02 DIAGNOSIS — K59.4 RECTAL SPASM: Primary | ICD-10-CM

## 2025-01-02 PROCEDURE — 3075F SYST BP GE 130 - 139MM HG: CPT | Performed by: NURSE PRACTITIONER

## 2025-01-02 PROCEDURE — G8427 DOCREV CUR MEDS BY ELIG CLIN: HCPCS | Performed by: NURSE PRACTITIONER

## 2025-01-02 PROCEDURE — 1123F ACP DISCUSS/DSCN MKR DOCD: CPT | Performed by: NURSE PRACTITIONER

## 2025-01-02 PROCEDURE — 1159F MED LIST DOCD IN RCRD: CPT | Performed by: NURSE PRACTITIONER

## 2025-01-02 PROCEDURE — 99213 OFFICE O/P EST LOW 20 MIN: CPT | Performed by: NURSE PRACTITIONER

## 2025-01-02 PROCEDURE — 3079F DIAST BP 80-89 MM HG: CPT | Performed by: NURSE PRACTITIONER

## 2025-01-02 PROCEDURE — 1090F PRES/ABSN URINE INCON ASSESS: CPT | Performed by: NURSE PRACTITIONER

## 2025-01-02 PROCEDURE — 1160F RVW MEDS BY RX/DR IN RCRD: CPT | Performed by: NURSE PRACTITIONER

## 2025-01-02 PROCEDURE — 1036F TOBACCO NON-USER: CPT | Performed by: NURSE PRACTITIONER

## 2025-01-02 PROCEDURE — G8417 CALC BMI ABV UP PARAM F/U: HCPCS | Performed by: NURSE PRACTITIONER

## 2025-01-02 PROCEDURE — G8399 PT W/DXA RESULTS DOCUMENT: HCPCS | Performed by: NURSE PRACTITIONER

## 2025-01-02 ASSESSMENT — ENCOUNTER SYMPTOMS
COUGH: 0
SHORTNESS OF BREATH: 0
CHEST TIGHTNESS: 0
COLOR CHANGE: 0
ABDOMINAL PAIN: 0
VOMITING: 0
NAUSEA: 0

## 2025-01-02 ASSESSMENT — PATIENT HEALTH QUESTIONNAIRE - PHQ9
SUM OF ALL RESPONSES TO PHQ QUESTIONS 1-9: 0
1. LITTLE INTEREST OR PLEASURE IN DOING THINGS: NOT AT ALL
2. FEELING DOWN, DEPRESSED OR HOPELESS: NOT AT ALL
SUM OF ALL RESPONSES TO PHQ QUESTIONS 1-9: 0
SUM OF ALL RESPONSES TO PHQ QUESTIONS 1-9: 0
SUM OF ALL RESPONSES TO PHQ9 QUESTIONS 1 & 2: 0
SUM OF ALL RESPONSES TO PHQ QUESTIONS 1-9: 0

## 2025-01-02 NOTE — PROGRESS NOTES
Visit Information    Have you changed or started any medications since your last visit including any over-the-counter medicines, vitamins, or herbal medicines? no   Have you stopped taking any of your medications? Is so, why? -  no  Are you having any side effects from any of your medications? - no    Have you seen any other physician or provider since your last visit?  no   Have you had any other diagnostic tests since your last visit?  no   Have you been seen in the emergency room and/or had an admission in a hospital since we last saw you?  no   Have you had your routine dental cleaning in the past 6 months?  no     Do you have an active MyChart account? If no, what is the barrier?  no    Patient Care Team:  Bhavani Bush APRN - CNP as PCP - General (Nurse Practitioner)  Bhavani Bush APRN - CNP as PCP - Empaneled Provider    Medical History Review  Past Medical, Family, and Social History reviewed and  contribute to the patient presenting condition    Health Maintenance   Topic Date Due    DTaP/Tdap/Td vaccine (1 - Tdap) Never done    Shingles vaccine (1 of 2) Never done    Respiratory Syncytial Virus (RSV) Pregnant or age 60 yrs+ (1 - 1-dose 75+ series) Never done    COVID-19 Vaccine (6 - 2023-24 season) 09/01/2024    Depression Screen  04/15/2025    Annual Wellness Visit (Medicare)  04/16/2025    DEXA (modify frequency per FRAX score)  Completed    Pneumococcal 65+ years Vaccine  Completed    Hepatitis A vaccine  Aged Out    Hepatitis B vaccine  Aged Out    Hib vaccine  Aged Out    Polio vaccine  Aged Out    Meningococcal (ACWY) vaccine  Aged Out    Hepatitis C screen  Discontinued

## 2025-01-02 NOTE — PROGRESS NOTES
[facility]  [address] [place] [address]  [phone]      Sis Dunn is a 79 y.o. female who presents today for her  medicalconditions/complaints as noted below.  Sis Dunn is c/o of Skin Problem (Starting itching again in back of neck, talked to derm but unsure if meds she is on may be making her itch or if something else going on, wondering if she should get shingles vaccine.  ) and Discuss Medications (Rectum cream for healing after BM, has been doing well with bowels.  Dr abernathy was seen previously and prescribed this cream, wondering if you can call in)  .    HPI:    HPI  Patient here today for simple medical questions.      States she did have shingles in the past 1 to and states she recently thought she was getting it again on the back of her neck but states no rashes developed and denies any pain at this time.  She does question if she should get the shingles vaccines?  She is worried about taking this as people often are sick afterwards and worried about any interactions with her medications.  She does also continue with dermatology.  She does have a history of atopic dermatitis and is using Opzelura for this.    Also wonders if PCP is okay managing her diltiazem cream for rectal spasms.  She does get this through a compounding pharmacy that was previously prescribed by colorectal specialist but she is no longer seeing him.  It is working well and recently had a refill.      Please note that this chart was generated using voice recognition Dragon dictation software.  Although every effort was made to ensure the accuracy of this automated transcription, some errors in transcription may have occurred.    Past Medical History:   Diagnosis Date    Atrial flutter (HCC)     with palpitations    Chronic constipation     whole life    Eczema     Frequent sinus infections     GERD (gastroesophageal reflux disease)     resolved    Hemorrhoid 10/26/2016    internal and

## 2025-03-13 ENCOUNTER — TELEPHONE (OUTPATIENT)
Dept: FAMILY MEDICINE CLINIC | Age: 80
End: 2025-03-13

## 2025-03-13 DIAGNOSIS — N64.4 BREAST PAIN, LEFT: Primary | ICD-10-CM

## 2025-03-13 NOTE — TELEPHONE ENCOUNTER
[facility] central scheduling called requesting mammogram order faxed to [phone].

## 2025-03-13 NOTE — TELEPHONE ENCOUNTER
[facility] scheduling called back stating they are needing a diagnotic hiral, with an US breast left limited.       States pt is having a burning sensation in the left breast

## 2025-03-27 ENCOUNTER — RESULTS FOLLOW-UP (OUTPATIENT)
Dept: FAMILY MEDICINE CLINIC | Age: 80
End: 2025-03-27

## 2025-03-27 DIAGNOSIS — N64.4 BREAST PAIN, LEFT: ICD-10-CM

## 2025-06-02 ENCOUNTER — OFFICE VISIT (OUTPATIENT)
Dept: FAMILY MEDICINE CLINIC | Age: 80
End: 2025-06-02
Payer: MEDICARE

## 2025-06-02 VITALS
HEART RATE: 72 BPM | TEMPERATURE: 97 F | WEIGHT: 199.4 LBS | HEIGHT: 66 IN | BODY MASS INDEX: 32.05 KG/M2 | SYSTOLIC BLOOD PRESSURE: 130 MMHG | DIASTOLIC BLOOD PRESSURE: 72 MMHG | OXYGEN SATURATION: 99 %

## 2025-06-02 DIAGNOSIS — E78.2 MIXED HYPERLIPIDEMIA: ICD-10-CM

## 2025-06-02 DIAGNOSIS — Z00.00 MEDICARE ANNUAL WELLNESS VISIT, SUBSEQUENT: Primary | ICD-10-CM

## 2025-06-02 DIAGNOSIS — R25.2 MUSCLE CRAMP: ICD-10-CM

## 2025-06-02 PROCEDURE — G0439 PPPS, SUBSEQ VISIT: HCPCS | Performed by: NURSE PRACTITIONER

## 2025-06-02 PROCEDURE — 3075F SYST BP GE 130 - 139MM HG: CPT | Performed by: NURSE PRACTITIONER

## 2025-06-02 PROCEDURE — 1160F RVW MEDS BY RX/DR IN RCRD: CPT | Performed by: NURSE PRACTITIONER

## 2025-06-02 PROCEDURE — 3078F DIAST BP <80 MM HG: CPT | Performed by: NURSE PRACTITIONER

## 2025-06-02 PROCEDURE — 1123F ACP DISCUSS/DSCN MKR DOCD: CPT | Performed by: NURSE PRACTITIONER

## 2025-06-02 PROCEDURE — 1159F MED LIST DOCD IN RCRD: CPT | Performed by: NURSE PRACTITIONER

## 2025-06-02 RX ORDER — HYDRALAZINE HYDROCHLORIDE 50 MG/1
50 TABLET, FILM COATED ORAL 3 TIMES DAILY
Qty: 90 TABLET | Refills: 0 | Status: SHIPPED
Start: 2025-06-02

## 2025-06-02 SDOH — ECONOMIC STABILITY: FOOD INSECURITY: WITHIN THE PAST 12 MONTHS, YOU WORRIED THAT YOUR FOOD WOULD RUN OUT BEFORE YOU GOT MONEY TO BUY MORE.: NEVER TRUE

## 2025-06-02 SDOH — ECONOMIC STABILITY: FOOD INSECURITY: WITHIN THE PAST 12 MONTHS, THE FOOD YOU BOUGHT JUST DIDN'T LAST AND YOU DIDN'T HAVE MONEY TO GET MORE.: NEVER TRUE

## 2025-06-02 ASSESSMENT — PATIENT HEALTH QUESTIONNAIRE - PHQ9
3. TROUBLE FALLING OR STAYING ASLEEP: NOT AT ALL
SUM OF ALL RESPONSES TO PHQ QUESTIONS 1-9: 3
9. THOUGHTS THAT YOU WOULD BE BETTER OFF DEAD, OR OF HURTING YOURSELF: NOT AT ALL
2. FEELING DOWN, DEPRESSED OR HOPELESS: NOT AT ALL
SUM OF ALL RESPONSES TO PHQ QUESTIONS 1-9: 3
5. POOR APPETITE OR OVEREATING: NOT AT ALL
8. MOVING OR SPEAKING SO SLOWLY THAT OTHER PEOPLE COULD HAVE NOTICED. OR THE OPPOSITE, BEING SO FIGETY OR RESTLESS THAT YOU HAVE BEEN MOVING AROUND A LOT MORE THAN USUAL: NOT AT ALL
1. LITTLE INTEREST OR PLEASURE IN DOING THINGS: NEARLY EVERY DAY
10. IF YOU CHECKED OFF ANY PROBLEMS, HOW DIFFICULT HAVE THESE PROBLEMS MADE IT FOR YOU TO DO YOUR WORK, TAKE CARE OF THINGS AT HOME, OR GET ALONG WITH OTHER PEOPLE: NOT DIFFICULT AT ALL
7. TROUBLE CONCENTRATING ON THINGS, SUCH AS READING THE NEWSPAPER OR WATCHING TELEVISION: NOT AT ALL
4. FEELING TIRED OR HAVING LITTLE ENERGY: NOT AT ALL
SUM OF ALL RESPONSES TO PHQ QUESTIONS 1-9: 3
SUM OF ALL RESPONSES TO PHQ QUESTIONS 1-9: 3
6. FEELING BAD ABOUT YOURSELF - OR THAT YOU ARE A FAILURE OR HAVE LET YOURSELF OR YOUR FAMILY DOWN: NOT AT ALL

## 2025-06-02 ASSESSMENT — VISUAL ACUITY
OD_CC: 20/30
OS_CC: 20/20

## 2025-06-02 ASSESSMENT — LIFESTYLE VARIABLES
HOW OFTEN DO YOU HAVE A DRINK CONTAINING ALCOHOL: NEVER
HOW MANY STANDARD DRINKS CONTAINING ALCOHOL DO YOU HAVE ON A TYPICAL DAY: PATIENT DOES NOT DRINK

## 2025-06-02 NOTE — PROGRESS NOTES
Medicare Annual Wellness Visit    Sis Dunn is here for Medicare AWV    Assessment & Plan   Medicare annual wellness visit, subsequent  Overall she is doing well  Continues closely with cardio, ortho, ENT, allergist  Update basic labs  Declines dexa scan  Patient advised to follow a low refined carb, low sugar diet with healthy fats such as avocados, variety of nuts,extra virgin olive oil and omega 3's.  Recommend 150 minutes of cardiovascular exercise per week as able and tolerated.     Muscle cramp  Check new labs, could also be r/t to chronic back issues?  Daily stretching advised    -     Magnesium; Future  -     Basic Metabolic Panel; Future  -     TSH; Future  Mixed hyperlipidemia  -     Lipid Panel; Future       2/2/25 1803    White Blood Cells  4.0 - 11.0 X10E9/L 8.3   RBC count  3.80 - 5.20 X10E12/L 4.29   Hemoglobin  11.7 - 15.5 g/dL 12.8   Hematocrit  35 - 47 % 36.8   MCV  80 - 100 fL 86   MCH  27 - 34 pg 29.7   MCHC  32 - 36 g/dL 34.7   RDW  11.5 - 15.0 % 13.8   Platelets  150 - 450 X10E9/L 302   MPV  7 - 12 fL 8   % neutrophils  % 70.8   % lymphocytes  % 20.1   % monocytes  % 7.3   % eosinophils  % 1.2   % Basophils  % 0.6   Neutrophils Absolute (A)  1.5 - 6.6 X10E9/L 5.9   Lymphocytes Absolute  1.0 - 3.5 X10E9/L 1.7   Monocytes Absolute  0 - 0.9 X10E9/L 0.6   Eosinophils Absolute  0.0 - 0.4 X10E9/L 0.1   Basophils Absolute  0.0 - 0.2 X10E9/L 0.1       2/2/25 1803    Sodium  134 - 146 mmol/L 136   Potassium, Bld  3.5 - 5.0 mmol/L 3.3 Low    Chloride  98 - 109 mmol/L 99   CO2  22 - 32 mmol/L 28   Anion gap  5 - 15 mmol/L 9   BUN  5 - 27 mg/dL 14   Creatinine  0.40 - 1.00 mg/dL 0.88   Comment: METHOD TRACEABLE TO IDMS STANDARD   Glucose  65 - 99 mg/dL 108 High    Calcium  8.5 - 10.5 mg/dL 9.1   Total Protein  6.0 - 8.0 g/dL 7.3   Albumin  3.2 - 5.3 g/dL 4.2   Alkaline Phosphatase  39 - 130 U/L 63   AST  0 - 41 U/L 16   ALT  0 - 31 U/L 12   Total bilirubin  0.3 - 1.2 mg/dL 0.6   eGFR

## 2025-06-02 NOTE — PROGRESS NOTES
Visit Information    Have you changed or started any medications since your last visit including any over-the-counter medicines, vitamins, or herbal medicines? no   Have you stopped taking any of your medications? Is so, why? -  no  Are you having any side effects from any of your medications? - no    Have you seen any other physician or provider since your last visit?  no   Have you had any other diagnostic tests since your last visit?  no   Have you been seen in the emergency room and/or had an admission in a hospital since we last saw you?  no   Have you had your routine dental cleaning in the past 6 months?  no     Do you have an active MyChart account? If no, what is the barrier?  Yes    Patient Care Team:  Bhavani Bush APRN - CNP as PCP - General (Nurse Practitioner)  Bhavani Bush APRN - CNP as PCP - Empaneled Provider    Medical History Review  Past Medical, Family, and Social History reviewed and  contribute to the patient presenting condition    Health Maintenance   Topic Date Due    DTaP/Tdap/Td vaccine (1 - Tdap) Never done    Respiratory Syncytial Virus (RSV) Pregnant or age 60 yrs+ (1 - 1-dose 75+ series) Never done    COVID-19 Vaccine (6 - 2024-25 season) 09/01/2024    Annual Wellness Visit (Medicare)  04/16/2025    Shingles vaccine (1 of 2) 01/02/2026 (Originally 3/27/1995)    Depression Screen  01/02/2026    DEXA (modify frequency per FRAX score)  Completed    Pneumococcal 50+ years Vaccine  Completed    Hepatitis A vaccine  Aged Out    Hepatitis B vaccine  Aged Out    Hib vaccine  Aged Out    Polio vaccine  Aged Out    Meningococcal (ACWY) vaccine  Aged Out    Meningococcal B vaccine  Aged Out    Hepatitis C screen  Discontinued

## 2025-06-20 ENCOUNTER — HOSPITAL ENCOUNTER (OUTPATIENT)
Age: 80
Setting detail: SPECIMEN
Discharge: HOME | End: 2025-06-20

## 2025-06-20 DIAGNOSIS — R25.2 MUSCLE CRAMP: ICD-10-CM

## 2025-06-20 DIAGNOSIS — E78.2 MIXED HYPERLIPIDEMIA: ICD-10-CM

## 2025-06-20 LAB
ANION GAP SERPL CALCULATED.3IONS-SCNC: 9 MMOL/L (ref 9–16)
BUN SERPL-MCNC: 13 MG/DL (ref 8–23)
CALCIUM SERPL-MCNC: 9.4 MG/DL (ref 8.6–10.4)
CHLORIDE SERPL-SCNC: 103 MMOL/L (ref 98–107)
CHOLEST SERPL-MCNC: 192 MG/DL (ref 0–199)
CHOLESTEROL/HDL RATIO: 2.9
CO2 SERPL-SCNC: 27 MMOL/L (ref 20–31)
CREAT SERPL-MCNC: 0.8 MG/DL (ref 0.6–0.9)
GFR, ESTIMATED: 74 ML/MIN/1.73M2
GLUCOSE SERPL-MCNC: 93 MG/DL (ref 74–99)
HDLC SERPL-MCNC: 66 MG/DL
LDLC SERPL CALC-MCNC: 114 MG/DL (ref 0–100)
MAGNESIUM SERPL-MCNC: 2 MG/DL (ref 1.6–2.4)
POTASSIUM SERPL-SCNC: 4.6 MMOL/L (ref 3.7–5.3)
SODIUM SERPL-SCNC: 139 MMOL/L (ref 136–145)
TRIGL SERPL-MCNC: 60 MG/DL
TSH SERPL DL<=0.05 MIU/L-ACNC: 1.22 UIU/ML (ref 0.27–4.2)
VLDLC SERPL CALC-MCNC: 12 MG/DL (ref 1–30)

## 2025-06-22 ENCOUNTER — RESULTS FOLLOW-UP (OUTPATIENT)
Dept: FAMILY MEDICINE CLINIC | Age: 80
End: 2025-06-22

## 2025-08-24 ENCOUNTER — HOSPITAL ENCOUNTER (EMERGENCY)
Age: 80
Discharge: HOME | End: 2025-08-24
Payer: MEDICARE

## 2025-08-24 VITALS
SYSTOLIC BLOOD PRESSURE: 134 MMHG | OXYGEN SATURATION: 97 % | HEART RATE: 74 BPM | DIASTOLIC BLOOD PRESSURE: 65 MMHG | RESPIRATION RATE: 16 BRPM

## 2025-08-24 DIAGNOSIS — K64.8 INTERNAL HEMORRHOIDS: ICD-10-CM

## 2025-08-24 DIAGNOSIS — K62.5 RECTAL BLEEDING: Primary | ICD-10-CM

## 2025-08-24 LAB
ANION GAP SERPL CALCULATED.3IONS-SCNC: 10 MMOL/L (ref 9–16)
BASOPHILS # BLD: 0.04 K/UL (ref 0–0.2)
BASOPHILS NFR BLD: 1 % (ref 0–2)
BUN SERPL-MCNC: 15 MG/DL (ref 8–23)
CALCIUM SERPL-MCNC: 8.9 MG/DL (ref 8.8–10.2)
CHLORIDE SERPL-SCNC: 101 MMOL/L (ref 98–107)
CO2 SERPL-SCNC: 25 MMOL/L (ref 20–31)
CREAT SERPL-MCNC: 0.7 MG/DL (ref 0.5–0.9)
EOSINOPHIL # BLD: 0.12 K/UL (ref 0–0.44)
EOSINOPHILS RELATIVE PERCENT: 2 % (ref 1–4)
ERYTHROCYTE [DISTWIDTH] IN BLOOD BY AUTOMATED COUNT: 12.4 % (ref 11.8–14.4)
GFR, ESTIMATED: 87 ML/MIN/1.73M2
GLUCOSE SERPL-MCNC: 115 MG/DL (ref 82–115)
HCT VFR BLD AUTO: 31.7 % (ref 36.3–47.1)
HGB BLD-MCNC: 11.7 G/DL (ref 11.9–15.1)
IMM GRANULOCYTES # BLD AUTO: 0.03 K/UL (ref 0–0.3)
IMM GRANULOCYTES NFR BLD: 0 %
INR PPP: 1.2
LYMPHOCYTES NFR BLD: 1.15 K/UL (ref 1.1–3.7)
LYMPHOCYTES RELATIVE PERCENT: 16 % (ref 24–43)
MCH RBC QN AUTO: 30.4 PG (ref 25.2–33.5)
MCHC RBC AUTO-ENTMCNC: 36.9 G/DL (ref 28.4–34.8)
MCV RBC AUTO: 82.3 FL (ref 82.6–102.9)
MONOCYTES NFR BLD: 0.61 K/UL (ref 0.1–1.2)
MONOCYTES NFR BLD: 9 % (ref 3–12)
NEUTROPHILS NFR BLD: 72 % (ref 36–65)
NEUTS SEG NFR BLD: 5.07 K/UL (ref 1.5–8.1)
NRBC BLD-RTO: 0 PER 100 WBC
PARTIAL THROMBOPLASTIN TIME: 33.1 SEC (ref 23.9–33.8)
PLATELET # BLD AUTO: 286 K/UL (ref 138–453)
PMV BLD AUTO: 9.8 FL (ref 8.1–13.5)
POTASSIUM SERPL-SCNC: 3.1 MMOL/L (ref 3.7–5.3)
PROTHROMBIN TIME: 15.5 SEC (ref 11.5–14.2)
RBC # BLD AUTO: 3.85 M/UL (ref 3.95–5.11)
RBC # BLD: ABNORMAL 10*6/UL
SODIUM SERPL-SCNC: 135 MMOL/L (ref 136–145)
WBC OTHER # BLD: 7 K/UL (ref 3.5–11.3)

## 2025-08-24 PROCEDURE — 85730 THROMBOPLASTIN TIME PARTIAL: CPT

## 2025-08-24 PROCEDURE — 80048 BASIC METABOLIC PNL TOTAL CA: CPT

## 2025-08-24 PROCEDURE — 99283 EMERGENCY DEPT VISIT LOW MDM: CPT

## 2025-08-24 PROCEDURE — 85610 PROTHROMBIN TIME: CPT

## 2025-08-24 PROCEDURE — 85025 COMPLETE CBC W/AUTO DIFF WBC: CPT

## 2025-09-04 ENCOUNTER — TELEPHONE (OUTPATIENT)
Dept: GASTROENTEROLOGY | Age: 80
End: 2025-09-04

## 2025-09-04 ENCOUNTER — OFFICE VISIT (OUTPATIENT)
Dept: FAMILY MEDICINE CLINIC | Age: 80
End: 2025-09-04
Payer: MEDICARE

## 2025-09-04 VITALS
SYSTOLIC BLOOD PRESSURE: 134 MMHG | HEART RATE: 83 BPM | DIASTOLIC BLOOD PRESSURE: 62 MMHG | WEIGHT: 197 LBS | OXYGEN SATURATION: 98 % | HEIGHT: 66 IN | TEMPERATURE: 97.4 F | BODY MASS INDEX: 31.66 KG/M2

## 2025-09-04 DIAGNOSIS — R10.2 PELVIC PAIN: ICD-10-CM

## 2025-09-04 DIAGNOSIS — K59.04 CHRONIC IDIOPATHIC CONSTIPATION: Primary | ICD-10-CM

## 2025-09-04 DIAGNOSIS — N64.4 BREAST PAIN, LEFT: ICD-10-CM

## 2025-09-04 PROCEDURE — G8399 PT W/DXA RESULTS DOCUMENT: HCPCS | Performed by: NURSE PRACTITIONER

## 2025-09-04 PROCEDURE — 1160F RVW MEDS BY RX/DR IN RCRD: CPT | Performed by: NURSE PRACTITIONER

## 2025-09-04 PROCEDURE — 1036F TOBACCO NON-USER: CPT | Performed by: NURSE PRACTITIONER

## 2025-09-04 PROCEDURE — 1090F PRES/ABSN URINE INCON ASSESS: CPT | Performed by: NURSE PRACTITIONER

## 2025-09-04 PROCEDURE — 1123F ACP DISCUSS/DSCN MKR DOCD: CPT | Performed by: NURSE PRACTITIONER

## 2025-09-04 PROCEDURE — G8427 DOCREV CUR MEDS BY ELIG CLIN: HCPCS | Performed by: NURSE PRACTITIONER

## 2025-09-04 PROCEDURE — 1159F MED LIST DOCD IN RCRD: CPT | Performed by: NURSE PRACTITIONER

## 2025-09-04 PROCEDURE — 99213 OFFICE O/P EST LOW 20 MIN: CPT | Performed by: NURSE PRACTITIONER

## 2025-09-04 PROCEDURE — 3075F SYST BP GE 130 - 139MM HG: CPT | Performed by: NURSE PRACTITIONER

## 2025-09-04 PROCEDURE — G8417 CALC BMI ABV UP PARAM F/U: HCPCS | Performed by: NURSE PRACTITIONER

## 2025-09-04 PROCEDURE — 3078F DIAST BP <80 MM HG: CPT | Performed by: NURSE PRACTITIONER

## 2025-09-04 ASSESSMENT — ENCOUNTER SYMPTOMS
RECTAL PAIN: 0
NAUSEA: 0
ANAL BLEEDING: 0
VOMITING: 0
ABDOMINAL PAIN: 0
SHORTNESS OF BREATH: 0
TROUBLE SWALLOWING: 0
CONSTIPATION: 1
ABDOMINAL DISTENTION: 0
COLOR CHANGE: 0
BLOOD IN STOOL: 0
BREAST PAIN: 1
DIARRHEA: 0